# Patient Record
Sex: FEMALE | Race: WHITE | Employment: OTHER | URBAN - METROPOLITAN AREA
[De-identification: names, ages, dates, MRNs, and addresses within clinical notes are randomized per-mention and may not be internally consistent; named-entity substitution may affect disease eponyms.]

---

## 2022-02-22 ENCOUNTER — OFFICE VISIT (OUTPATIENT)
Dept: FAMILY MEDICINE CLINIC | Facility: CLINIC | Age: 77
End: 2022-02-22
Payer: MEDICARE

## 2022-02-22 VITALS
RESPIRATION RATE: 16 BRPM | HEART RATE: 90 BPM | DIASTOLIC BLOOD PRESSURE: 60 MMHG | BODY MASS INDEX: 29.45 KG/M2 | SYSTOLIC BLOOD PRESSURE: 152 MMHG | WEIGHT: 156 LBS | TEMPERATURE: 97.9 F | HEIGHT: 61 IN | OXYGEN SATURATION: 98 %

## 2022-02-22 DIAGNOSIS — E03.8 SUBCLINICAL HYPOTHYROIDISM: ICD-10-CM

## 2022-02-22 DIAGNOSIS — R73.01 IFG (IMPAIRED FASTING GLUCOSE): ICD-10-CM

## 2022-02-22 DIAGNOSIS — F32.A MILD DEPRESSION: ICD-10-CM

## 2022-02-22 DIAGNOSIS — Z11.59 NEED FOR HEPATITIS C SCREENING TEST: ICD-10-CM

## 2022-02-22 DIAGNOSIS — I10 BENIGN ESSENTIAL HTN: Primary | ICD-10-CM

## 2022-02-22 PROCEDURE — 99204 OFFICE O/P NEW MOD 45 MIN: CPT | Performed by: FAMILY MEDICINE

## 2022-02-22 RX ORDER — LOSARTAN POTASSIUM AND HYDROCHLOROTHIAZIDE 12.5; 5 MG/1; MG/1
1 TABLET ORAL EVERY MORNING
Qty: 30 TABLET | Refills: 5 | Status: SHIPPED | OUTPATIENT
Start: 2022-02-22

## 2022-02-22 RX ORDER — DOCUSATE SODIUM 100 MG/1
100 CAPSULE, LIQUID FILLED ORAL 2 TIMES DAILY
COMMUNITY

## 2022-02-22 RX ORDER — CHLORAL HYDRATE 500 MG
1000 CAPSULE ORAL DAILY
COMMUNITY

## 2022-02-22 RX ORDER — DIPHENOXYLATE HYDROCHLORIDE AND ATROPINE SULFATE 2.5; .025 MG/1; MG/1
1 TABLET ORAL DAILY
COMMUNITY

## 2022-02-22 RX ORDER — LISINOPRIL AND HYDROCHLOROTHIAZIDE 20; 12.5 MG/1; MG/1
TABLET ORAL
COMMUNITY
Start: 2021-12-31 | End: 2022-02-22 | Stop reason: SINTOL

## 2022-02-22 NOTE — PROGRESS NOTES
Assessment/Plan:     Problem List Items Addressed This Visit        Unprioritized    Benign essential HTN - Primary    Relevant Medications    losartan-hydrochlorothiazide (HYZAAR) 50-12 5 mg per tablet    Other Relevant Orders    Lipid panel    CBC and differential    Comprehensive metabolic panel    IFG (impaired fasting glucose)    Relevant Medications    losartan-hydrochlorothiazide (HYZAAR) 50-12 5 mg per tablet    Other Relevant Orders    Hemoglobin A1C    Subclinical hypothyroidism    Relevant Orders    TSH, 3rd generation with Free T4 reflex      Other Visit Diagnoses     Need for hepatitis C screening test        Relevant Orders    Hepatitis C antibody    Mild depression ([address] )          d/c lisinopril - cough   Start losartan-hydrochlorothiazide 50-12 5 mg daily   Mild depression on screening today  Pt does not feel depressed - feels normal grief process and life events  Does not feel she needs any help with this  Update fasting blood work including TSH  Will treat if getting higher - symptoms of fatigue, weight gain, constipation noted  Follow up in 4 weeks for labs, HTN   Follow up sept for [address]  Request old records for immunizations, dexa, colonoscopy     Return in 1 month (on 3/22/2022) for bp & labs       Subjective:   Teresita Roe is a 68 y o  female here today for a follow-up on her current medical conditions:  Patient Active Problem List   Diagnosis    Benign essential HTN    Subclinical hypothyroidism    IFG (impaired fasting glucose)        Current Medications:  Current Outpatient Medications   Medication Sig Dispense Refill    docusate sodium (COLACE) 100 mg capsule Take 100 mg by mouth 2 (two) times a day      multivitamin (THERAGRAN) TABS Take 1 tablet by mouth daily      Omega-3 Fatty Acids (fish oil) 1,000 mg Take 1,000 mg by mouth daily      losartan-hydrochlorothiazide (HYZAAR) 50-12 5 mg per tablet Take 1 tablet by mouth every morning 30 tablet 5     No current facility-administered medications for this visit  HPI:  Chief Complaint   Patient presents with    Hypertension    Cough     THINK B/P MEDICATION IS REASON FOR COUGH    Hand Pain     B/L      -- Above per clinical staff and reviewed  --    PHQ-2/9 Depression Screening    Little interest or pleasure in doing things: 3 - nearly every day  Feeling down, depressed, or hopeless: 0 - not at all  Trouble falling or staying asleep, or sleeping too much: 0 - not at all  Feeling tired or having little energy: 0 - not at all  Poor appetite or overeatin - several days  Feeling bad about yourself - or that you are a failure or have let yourself or your family down: 0 - not at all  Trouble concentrating on things, such as reading the newspaper or watching television: 0 - not at all  Moving or speaking so slowly that other people could have noticed  Or the opposite - being so fidgety or restless that you have been moving around a lot more than usual: 0 - not at all  Thoughts that you would be better off dead, or of hurting yourself in some way: 0 - not at all  PHQ-2 Score: 3  PHQ-2 Interpretation: POSITIVE depression screen  PHQ-9 Score: 4   PHQ-9 Interpretation: No or Minimal depression             New pt - shared record summary reviewed  labs HbA1C 21 6 1%, lipids 21   , HDL 74 , TG 72, Cr 0 92, urine: cr neg     dtr Sheila Sandoval lives in [place]   Today:  Here today with daughter Rashi Cannon   Patient provides all of her own history   Vaccines - requested records   3 COVID vaccines   She also had dexa scan - not sure of results   Cough went away when she stopped medicine   Bilateral hand pain   Lost weight and sugars improved and off meds   Her   Oct 2018 after illness with lymphoma for a few years  She cared for him  Before that for her mother     2 dtrs   4 grandkids - first grandchild on the way   Has been off of meds for about 4 years   Living with daughter since last year   30 pound weight loss - small portions and walking - was on glipizide only   Some dry skin, some weight gain  Not much fatigue or constipation   More sedentary recently - may be reason for weight gain   Has home blood pressure readings - has been very well controlled     The following portions of the patient's history were reviewed and updated as appropriate: allergies, current medications, past family history, past medical history, past social history, past surgical history and problem list     Objective:  Vitals:  /60   Pulse 90   Temp 97 9 °F (36 6 °C)   Resp 16   Ht 5' 0 63" (1 54 m)   Wt 70 8 kg (156 lb)   SpO2 98%   BMI 29 84 kg/m²    Wt Readings from Last 3 Encounters:   02/22/22 70 8 kg (156 lb)      BP Readings from Last 3 Encounters:   02/22/22 152/60        Review of Systems   She has no other concerns  Some weight gain  No chest pain, SOB, or palpitations  No GERD  + changes in bowels, no changes in bladder  Not sleeping well  mild mood changes  + fatigue  Physical Exam   Constitutional:  she appears well-developed and well-nourished  HENT: Head: Normocephalic  Neck: Neck supple  Cardiovascular: Normal rate, regular rhythm and normal heart sounds  Pulmonary/Chest: Effort normal and breath sounds normal  No wheezes, rales, or rhonchi  Abdominal: Soft  Bowel sounds are normal  There is no tenderness  No hepatosplenomegaly  Musculoskeletal: she exhibits no edema  Lymphadenopathy: she has no cervical adenopathy  Neurological: she is alert and oriented to person, place, and time  Skin: Skin is warm and dry  Psychiatric: she has a normal mood and affect  her behavior is normal  Thought content normal      BMI Counseling: Body mass index is 29 84 kg/m²  The BMI is above normal  Nutrition recommendations include decreasing portion sizes  Exercise recommendations include exercising 3-5 times per week  Rationale for BMI follow-up plan is due to patient being overweight or obese       Depression Screening and Follow-up Plan: Patient's depression screening was positive with a PHQ-2 score of 3  Their PHQ-9 score was 4  Patient assessed for underlying major depression  Brief counseling provided and recommend additional follow-up/re-evaluation next office visit  Spoke to pt alone  Pt denies depression   Gets down at times since loss of her  but feels this is normal

## 2022-02-23 PROBLEM — M85.89 OSTEOPENIA OF MULTIPLE SITES: Status: ACTIVE | Noted: 2022-02-23

## 2022-03-08 ENCOUNTER — APPOINTMENT (OUTPATIENT)
Dept: LAB | Facility: CLINIC | Age: 77
End: 2022-03-08
Payer: MEDICARE

## 2022-03-08 DIAGNOSIS — Z11.59 NEED FOR HEPATITIS C SCREENING TEST: ICD-10-CM

## 2022-03-08 DIAGNOSIS — R73.01 IFG (IMPAIRED FASTING GLUCOSE): ICD-10-CM

## 2022-03-08 DIAGNOSIS — I10 BENIGN ESSENTIAL HTN: ICD-10-CM

## 2022-03-08 DIAGNOSIS — E03.8 SUBCLINICAL HYPOTHYROIDISM: ICD-10-CM

## 2022-03-08 LAB
ALBUMIN SERPL BCP-MCNC: 3.7 G/DL (ref 3.5–5)
ALP SERPL-CCNC: 66 U/L (ref 46–116)
ALT SERPL W P-5'-P-CCNC: 30 U/L (ref 12–78)
ANION GAP SERPL CALCULATED.3IONS-SCNC: 8 MMOL/L (ref 4–13)
AST SERPL W P-5'-P-CCNC: 17 U/L (ref 5–45)
BASOPHILS # BLD AUTO: 0.05 THOUSANDS/ΜL (ref 0–0.1)
BASOPHILS NFR BLD AUTO: 1 % (ref 0–1)
BILIRUB SERPL-MCNC: 0.45 MG/DL (ref 0.2–1)
BUN SERPL-MCNC: 20 MG/DL (ref 5–25)
CALCIUM SERPL-MCNC: 9.6 MG/DL (ref 8.3–10.1)
CHLORIDE SERPL-SCNC: 102 MMOL/L (ref 100–108)
CHOLEST SERPL-MCNC: 224 MG/DL
CO2 SERPL-SCNC: 26 MMOL/L (ref 21–32)
CREAT SERPL-MCNC: 1.01 MG/DL (ref 0.6–1.3)
EOSINOPHIL # BLD AUTO: 0.13 THOUSAND/ΜL (ref 0–0.61)
EOSINOPHIL NFR BLD AUTO: 2 % (ref 0–6)
ERYTHROCYTE [DISTWIDTH] IN BLOOD BY AUTOMATED COUNT: 13.1 % (ref 11.6–15.1)
EST. AVERAGE GLUCOSE BLD GHB EST-MCNC: 128 MG/DL
GFR SERPL CREATININE-BSD FRML MDRD: 54 ML/MIN/1.73SQ M
GLUCOSE P FAST SERPL-MCNC: 131 MG/DL (ref 65–99)
HBA1C MFR BLD: 6.1 %
HCT VFR BLD AUTO: 39.9 % (ref 34.8–46.1)
HCV AB SER QL: NORMAL
HDLC SERPL-MCNC: 70 MG/DL
HGB BLD-MCNC: 12.9 G/DL (ref 11.5–15.4)
IMM GRANULOCYTES # BLD AUTO: 0.02 THOUSAND/UL (ref 0–0.2)
IMM GRANULOCYTES NFR BLD AUTO: 0 % (ref 0–2)
LDLC SERPL CALC-MCNC: 130 MG/DL (ref 0–100)
LYMPHOCYTES # BLD AUTO: 2.3 THOUSANDS/ΜL (ref 0.6–4.47)
LYMPHOCYTES NFR BLD AUTO: 30 % (ref 14–44)
MCH RBC QN AUTO: 31.4 PG (ref 26.8–34.3)
MCHC RBC AUTO-ENTMCNC: 32.3 G/DL (ref 31.4–37.4)
MCV RBC AUTO: 97 FL (ref 82–98)
MONOCYTES # BLD AUTO: 0.53 THOUSAND/ΜL (ref 0.17–1.22)
MONOCYTES NFR BLD AUTO: 7 % (ref 4–12)
NEUTROPHILS # BLD AUTO: 4.56 THOUSANDS/ΜL (ref 1.85–7.62)
NEUTS SEG NFR BLD AUTO: 60 % (ref 43–75)
NONHDLC SERPL-MCNC: 154 MG/DL
NRBC BLD AUTO-RTO: 0 /100 WBCS
PLATELET # BLD AUTO: 277 THOUSANDS/UL (ref 149–390)
PMV BLD AUTO: 10.3 FL (ref 8.9–12.7)
POTASSIUM SERPL-SCNC: 3.4 MMOL/L (ref 3.5–5.3)
PROT SERPL-MCNC: 8.4 G/DL (ref 6.4–8.2)
RBC # BLD AUTO: 4.11 MILLION/UL (ref 3.81–5.12)
SODIUM SERPL-SCNC: 136 MMOL/L (ref 136–145)
T4 FREE SERPL-MCNC: 0.96 NG/DL (ref 0.76–1.46)
TRIGL SERPL-MCNC: 118 MG/DL
TSH SERPL DL<=0.05 MIU/L-ACNC: 4.71 UIU/ML (ref 0.36–3.74)
WBC # BLD AUTO: 7.59 THOUSAND/UL (ref 4.31–10.16)

## 2022-03-08 PROCEDURE — 80053 COMPREHEN METABOLIC PANEL: CPT

## 2022-03-08 PROCEDURE — 84439 ASSAY OF FREE THYROXINE: CPT

## 2022-03-08 PROCEDURE — 84443 ASSAY THYROID STIM HORMONE: CPT

## 2022-03-08 PROCEDURE — 83036 HEMOGLOBIN GLYCOSYLATED A1C: CPT

## 2022-03-08 PROCEDURE — 85025 COMPLETE CBC W/AUTO DIFF WBC: CPT

## 2022-03-08 PROCEDURE — 86803 HEPATITIS C AB TEST: CPT

## 2022-03-08 PROCEDURE — 80061 LIPID PANEL: CPT

## 2022-03-08 PROCEDURE — 36415 COLL VENOUS BLD VENIPUNCTURE: CPT

## 2022-03-20 PROBLEM — E78.00 HYPERCHOLESTEROLEMIA: Status: ACTIVE | Noted: 2022-03-20

## 2022-03-22 ENCOUNTER — OFFICE VISIT (OUTPATIENT)
Dept: FAMILY MEDICINE CLINIC | Facility: CLINIC | Age: 77
End: 2022-03-22
Payer: MEDICARE

## 2022-03-22 VITALS
RESPIRATION RATE: 16 BRPM | TEMPERATURE: 98 F | HEIGHT: 61 IN | HEART RATE: 74 BPM | OXYGEN SATURATION: 96 % | BODY MASS INDEX: 30.21 KG/M2 | WEIGHT: 160 LBS | SYSTOLIC BLOOD PRESSURE: 154 MMHG | DIASTOLIC BLOOD PRESSURE: 60 MMHG

## 2022-03-22 DIAGNOSIS — Z23 NEED FOR VACCINATION: ICD-10-CM

## 2022-03-22 DIAGNOSIS — R73.01 IFG (IMPAIRED FASTING GLUCOSE): ICD-10-CM

## 2022-03-22 DIAGNOSIS — E78.00 HYPERCHOLESTEROLEMIA: ICD-10-CM

## 2022-03-22 DIAGNOSIS — I10 BENIGN ESSENTIAL HTN: Primary | ICD-10-CM

## 2022-03-22 DIAGNOSIS — E87.6 HYPOKALEMIA: ICD-10-CM

## 2022-03-22 DIAGNOSIS — E03.9 ACQUIRED HYPOTHYROIDISM: ICD-10-CM

## 2022-03-22 DIAGNOSIS — N18.31 STAGE 3A CHRONIC KIDNEY DISEASE (HCC): ICD-10-CM

## 2022-03-22 PROCEDURE — 99214 OFFICE O/P EST MOD 30 MIN: CPT | Performed by: FAMILY MEDICINE

## 2022-03-22 RX ORDER — LEVOTHYROXINE SODIUM 0.03 MG/1
25 TABLET ORAL
Qty: 90 TABLET | Refills: 3 | Status: SHIPPED | OUTPATIENT
Start: 2022-03-22 | End: 2022-07-01

## 2022-03-22 RX ORDER — ATORVASTATIN CALCIUM 20 MG/1
20 TABLET, FILM COATED ORAL DAILY
Qty: 90 TABLET | Refills: 3 | Status: SHIPPED | OUTPATIENT
Start: 2022-03-22

## 2022-03-22 NOTE — PROGRESS NOTES
Assessment/Plan:   1  Benign essential HTN  Home blood pressures well controlled  Mild hypokalemia on blood work  Will add high potassium foods   If continues consider d/c hydrochlorothiazide       3  IFG (impaired fasting glucose)  New  Watch carbs in diet  Update labs in 6 weeks  4  Hypercholesterolemia  Reviewed ASCVD risk  Reviewed pros and cons of statin  Agrees to start statin  Risks and benefits and side effects of lipitor  Start wth 20 mg  Check labs in 8 weeks  The 10-year ASCVD risk score (Krysta Berry et al , 2013) is: 32 5%    Values used to calculate the score:      Age: 68 years      Sex: Female      Is Non- : No      Diabetic: No      Tobacco smoker: No      Systolic Blood Pressure: 810 mmHg      Is BP treated: Yes      HDL Cholesterol: 70 mg/dL      Total Cholesterol: 224 mg/dL    - atorvastatin (LIPITOR) 20 mg tablet; Take 1 tablet (20 mg total) by mouth daily  Dispense: 90 tablet; Refill: 3  - LDL cholesterol, direct; Future  - Comprehensive metabolic panel; Future    5  Acquired hypothyroidism  New  Start synthroid  Reviewed how to take  Update labs in 8 weeks   - levothyroxine (Synthroid) 25 mcg tablet; Take 1 tablet (25 mcg total) by mouth daily in the early morning  Dispense: 90 tablet; Refill: 3  - TSH, 3rd generation with Free T4 reflex; Future    6  Stage 3a chronic kidney disease (HCC)  Stable  On ARB  7  Need for vaccination  Can get at pharmacy if she wishes  - tetanus-diphtheria-acellular pertussis (ADACEL) 5-2-15 5 LF-mcg/0 5 injection; Inject 0 5 mL into a muscle once for 1 dose  Dispense: 0 5 mL; Refill: 0    8  Hypokalemia  See above    Follow up for medicare wellness visit  Await old records for PPSV23 vaccine  If not received will update vaccine at this appointment  No follow-ups on file      Subjective:   Rosemarie Mccoy is a 68 y o  female here today for a follow-up on her current medical conditions:  Patient Active Problem List   Diagnosis    Benign essential HTN    Subclinical hypothyroidism    IFG (impaired fasting glucose)    Osteopenia of multiple sites    Hypercholesterolemia        Current Medications:  Current Outpatient Medications   Medication Sig Dispense Refill    docusate sodium (COLACE) 100 mg capsule Take 100 mg by mouth 2 (two) times a day      losartan-hydrochlorothiazide (HYZAAR) 50-12 5 mg per tablet Take 1 tablet by mouth every morning 30 tablet 5    multivitamin (THERAGRAN) TABS Take 1 tablet by mouth daily      Omega-3 Fatty Acids (fish oil) 1,000 mg Take 1,000 mg by mouth daily       No current facility-administered medications for this visit  HPI:  Chief Complaint   Patient presents with    Depression    Hypertension     -- Above per clinical staff and reviewed  --    PHQ-2/9 Depression Screening              march 2022 labs done   New pt - shared record summary reviewed  labs HbA1C 9/14/21 6 1%, liids 5/22/21   , HDL 74 , TG 72, Cr 0 92, urine: cr neg     dtr Willistine Seats   Today:  Pt here with daughter to review labs and check blood pressure    To be a great grandmother August 17th - As[place]      The following portions of the patient's history were reviewed and updated as appropriate: allergies, current medications, past family history, past medical history, past social history, past surgical history and problem list     Objective:  Vitals:  /60   Pulse 74   Temp 98 °F (36 7 °C)   Resp 16   Ht 5' 0 63" (1 54 m)   Wt 72 6 kg (160 lb)   SpO2 96%   BMI 30 60 kg/m²    Wt Readings from Last 3 Encounters:   03/22/22 72 6 kg (160 lb)   02/22/22 70 8 kg (156 lb)      BP Readings from Last 3 Encounters:   03/22/22 154/60   02/22/22 152/60        Review of Systems   She has no other concerns  No unexpected weight changes  No chest pain, SOB, or palpitations  No GERD  No changes in bowels or bladder  Sleeping well  No mood changes         Physical Exam   Constitutional:  she appears well-developed and well-nourished  HENT: Head: Normocephalic  Neck: Neck supple  Cardiovascular: Normal rate, regular rhythm and normal heart sounds  Pulmonary/Chest: Effort normal and breath sounds normal  No wheezes, rales, or rhonchi  Abdominal: Soft  Bowel sounds are normal  There is no tenderness  No hepatosplenomegaly  Musculoskeletal: she exhibits no edema  Lymphadenopathy: she has no cervical adenopathy  Neurological: she is alert and oriented to person, place, and time  Skin: Skin is warm and dry  Psychiatric: she has a normal mood and affect   her behavior is normal  Thought content normal

## 2022-03-22 NOTE — PATIENT INSTRUCTIONS
How to take synthroid:  Take once a day in the morning on an empty stomach for thyroid  An empty stomach means one hour before eating and 2 hours after eating  High-Potassium Foods  These foods contain more than 200 milligrams potassium per half-cup serving     ·         Apricots (2 medium)   ·         Artichokes (1 medium)   ·         Avocados (1/4 each)   ·         [place] squash   ·         Baked beans  ·         Black beans  ·         Bananas (1 medium)   ·         Beets and beet greens (cooked)   ·         [place]ton sprouts   ·         Carrots, raw  ·         Dried beans and peas  ·         Cantaloupe   ·         Dates, dried fruits  ·         Granola  ·         Grapefruit juice  ·         Honeydew  ·         Kiwi  ·         Raul  ·         Raisins   ·         Milk - all types (1 cup)  ·         Molasses  ·         Nectarines (1 each)   ·         Nuts and seeds (1 oz)  ·         Oranges and orange juice  ·         Papaya  ·         Pomegranate, pomegranate juice  ·         Parsnips   ·         Potatoes   ·         Potato chips   ·         Prunes and prune juice   ·         Pumpkin   ·         Spinach (cooked)   ·         Sweet potatoes   ·         Swiss chard (cooked)   ·         Tomatoes and tomato juice   ·         Vegetable juice  ·         Kohlrabi  ·         Lentils  ·         Legumes  ·         Mushrooms, canned  ·         Parsnips  ·         Potatoes, sweet and white   ·         Pumpkin  ·         R[place]abagas  ·         Yogurt  ·         Peanut butter (2 tsp)  ·         Snuff/chewing tobacco   ·         Chocolate (1 5 - 2 oz)  ·         Bran, bran products  ·         Salt substitutes

## 2022-06-30 ENCOUNTER — APPOINTMENT (OUTPATIENT)
Dept: LAB | Facility: CLINIC | Age: 77
End: 2022-06-30
Payer: MEDICARE

## 2022-06-30 DIAGNOSIS — E78.00 HYPERCHOLESTEROLEMIA: ICD-10-CM

## 2022-06-30 DIAGNOSIS — E03.9 ACQUIRED HYPOTHYROIDISM: ICD-10-CM

## 2022-06-30 LAB
ALBUMIN SERPL BCP-MCNC: 3.5 G/DL (ref 3.5–5)
ALP SERPL-CCNC: 76 U/L (ref 46–116)
ALT SERPL W P-5'-P-CCNC: 33 U/L (ref 12–78)
ANION GAP SERPL CALCULATED.3IONS-SCNC: 4 MMOL/L (ref 4–13)
AST SERPL W P-5'-P-CCNC: 18 U/L (ref 5–45)
BILIRUB SERPL-MCNC: 0.54 MG/DL (ref 0.2–1)
BUN SERPL-MCNC: 15 MG/DL (ref 5–25)
CALCIUM SERPL-MCNC: 9.7 MG/DL (ref 8.3–10.1)
CHLORIDE SERPL-SCNC: 103 MMOL/L (ref 100–108)
CO2 SERPL-SCNC: 30 MMOL/L (ref 21–32)
CREAT SERPL-MCNC: 0.88 MG/DL (ref 0.6–1.3)
GFR SERPL CREATININE-BSD FRML MDRD: 64 ML/MIN/1.73SQ M
GLUCOSE P FAST SERPL-MCNC: 142 MG/DL (ref 65–99)
LDLC SERPL DIRECT ASSAY-MCNC: 51 MG/DL (ref 0–100)
POTASSIUM SERPL-SCNC: 3.7 MMOL/L (ref 3.5–5.3)
PROT SERPL-MCNC: 7.7 G/DL (ref 6.4–8.2)
SODIUM SERPL-SCNC: 137 MMOL/L (ref 136–145)
T4 FREE SERPL-MCNC: 1.65 NG/DL (ref 0.76–1.46)
TSH SERPL DL<=0.05 MIU/L-ACNC: 0.01 UIU/ML (ref 0.45–4.5)

## 2022-06-30 PROCEDURE — 80053 COMPREHEN METABOLIC PANEL: CPT

## 2022-06-30 PROCEDURE — 84439 ASSAY OF FREE THYROXINE: CPT

## 2022-06-30 PROCEDURE — 83721 ASSAY OF BLOOD LIPOPROTEIN: CPT

## 2022-06-30 PROCEDURE — 84443 ASSAY THYROID STIM HORMONE: CPT

## 2022-06-30 PROCEDURE — 36415 COLL VENOUS BLD VENIPUNCTURE: CPT

## 2022-07-01 DIAGNOSIS — E03.8 SUBCLINICAL HYPOTHYROIDISM: Primary | ICD-10-CM

## 2022-09-21 ENCOUNTER — APPOINTMENT (OUTPATIENT)
Dept: LAB | Facility: CLINIC | Age: 77
End: 2022-09-21
Payer: MEDICARE

## 2022-09-21 DIAGNOSIS — E03.8 SUBCLINICAL HYPOTHYROIDISM: ICD-10-CM

## 2022-09-21 LAB — TSH SERPL DL<=0.05 MIU/L-ACNC: 3.6 UIU/ML (ref 0.45–4.5)

## 2022-09-21 PROCEDURE — 36415 COLL VENOUS BLD VENIPUNCTURE: CPT

## 2022-09-21 PROCEDURE — 84443 ASSAY THYROID STIM HORMONE: CPT

## 2022-10-02 ENCOUNTER — TELEPHONE (OUTPATIENT)
Dept: FAMILY MEDICINE CLINIC | Facility: CLINIC | Age: 77
End: 2022-10-02

## 2022-10-03 NOTE — TELEPHONE ENCOUNTER
Please call last doctor - [facility] (see scanned labs to see info) and ask them to fax over just her immunization records to our back fax number (Back fax line [phone])  We did not receive them and she has physical this week

## 2022-10-07 ENCOUNTER — OFFICE VISIT (OUTPATIENT)
Dept: FAMILY MEDICINE CLINIC | Facility: CLINIC | Age: 77
End: 2022-10-07
Payer: MEDICARE

## 2022-10-07 VITALS
RESPIRATION RATE: 16 BRPM | TEMPERATURE: 97.2 F | BODY MASS INDEX: 32.24 KG/M2 | HEART RATE: 70 BPM | WEIGHT: 164.2 LBS | HEIGHT: 60 IN | SYSTOLIC BLOOD PRESSURE: 152 MMHG | DIASTOLIC BLOOD PRESSURE: 68 MMHG | OXYGEN SATURATION: 96 %

## 2022-10-07 DIAGNOSIS — E78.00 HYPERCHOLESTEROLEMIA: ICD-10-CM

## 2022-10-07 DIAGNOSIS — I10 BENIGN ESSENTIAL HTN: ICD-10-CM

## 2022-10-07 DIAGNOSIS — R73.01 IFG (IMPAIRED FASTING GLUCOSE): ICD-10-CM

## 2022-10-07 DIAGNOSIS — E03.8 SUBCLINICAL HYPOTHYROIDISM: ICD-10-CM

## 2022-10-07 DIAGNOSIS — Z00.00 MEDICARE ANNUAL WELLNESS VISIT, INITIAL: Primary | ICD-10-CM

## 2022-10-07 DIAGNOSIS — N18.31 STAGE 3A CHRONIC KIDNEY DISEASE (HCC): ICD-10-CM

## 2022-10-07 DIAGNOSIS — Z23 NEED FOR VACCINATION: ICD-10-CM

## 2022-10-07 PROCEDURE — G0008 ADMIN INFLUENZA VIRUS VAC: HCPCS

## 2022-10-07 PROCEDURE — 90662 IIV NO PRSV INCREASED AG IM: CPT

## 2022-10-07 PROCEDURE — G0438 PPPS, INITIAL VISIT: HCPCS | Performed by: FAMILY MEDICINE

## 2022-10-07 PROCEDURE — 99214 OFFICE O/P EST MOD 30 MIN: CPT | Performed by: FAMILY MEDICINE

## 2022-10-07 RX ORDER — LOSARTAN POTASSIUM AND HYDROCHLOROTHIAZIDE 12.5; 5 MG/1; MG/1
1 TABLET ORAL EVERY MORNING
Qty: 90 TABLET | Refills: 1 | Status: SHIPPED | OUTPATIENT
Start: 2022-10-07

## 2022-10-07 RX ORDER — ATORVASTATIN CALCIUM 20 MG/1
20 TABLET, FILM COATED ORAL DAILY
Qty: 90 TABLET | Refills: 1 | Status: SHIPPED | OUTPATIENT
Start: 2022-10-07

## 2022-10-07 NOTE — PROGRESS NOTES
Assessment and Plan:     Problem List Items Addressed This Visit        Unprioritized    Subclinical hypothyroidism    Stage 3a chronic kidney disease ([facility] )    IFG (impaired fasting glucose)    Hypercholesterolemia    Relevant Medications    atorvastatin (LIPITOR) 20 mg tablet    Benign essential HTN    Relevant Medications    losartan-hydrochlorothiazide (HYZAAR) 50-12 5 mg per tablet      Other Visit Diagnoses     Medicare annual wellness visit, initial    -  Primary    Need for vaccination        Relevant Orders    influenza vaccine, high-dose, PF 0 7 mL (FLUZONE HIGH-DOSE) (Completed)        BMI Counseling: Body mass index is 32 07 kg/m²  The BMI is above normal  Nutrition recommendations include decreasing portion sizes  Exercise recommendations include exercising 3-5 times per week  Rationale for BMI follow-up plan is due to patient being overweight or obese  Depression Screening and Follow-up Plan: Patient was screened for depression during today's encounter  They screened negative with a PHQ-2 score of 0  Preventive health issues were discussed with patient, and age appropriate screening tests were ordered as noted in patient's After Visit Summary  Personalized health advice and appropriate referrals for health education or preventive services given if needed, as noted in patient's After Visit Summary       History of Present Illness:     Patient presents for a Medicare Wellness Visit    HPI   Patient Care Team:  Giana Sierra DO as PCP - General (Family Medicine)     Review of Systems:     Review of Systems     Problem List:     Patient Active Problem List   Diagnosis   • Benign essential HTN   • Subclinical hypothyroidism   • IFG (impaired fasting glucose)   • Osteopenia of multiple sites   • Hypercholesterolemia   • Stage 3a chronic kidney disease ([facility] )   • Counseling regarding advanced directives      Past Medical and Surgical History:     Past Medical History:   Diagnosis Date   • Diabetes mellitus ([facility] )    • Hypertension      Past Surgical History:   Procedure Laterality Date   • CHOLECYSTECTOMY     • WRIST FRACTURE SURGERY Right 1982      Family History:     Family History   Problem Relation Age of Onset   • Heart disease Mother    • Hypertension Mother    • Diabetes Mother    • Heart disease Father    • Cervical cancer Sister       Social History:     Social History     Socioeconomic History   • Marital status:      Spouse name: None   • Number of children: 2   • Years of education: None   • Highest education level: None   Occupational History   • Occupation: RETIRED    Tobacco Use   • Smoking status: Never Smoker   • Smokeless tobacco: Never Used   Vaping Use   • Vaping Use: Never used   Substance and Sexual Activity   • Alcohol use: Yes     Alcohol/week: 1 0 standard drink     Types: 1 Glasses of wine per week     Comment: 1 - 2 per week    • Drug use: Never   • Sexual activity: Not Currently   Other Topics Concern   • None   Social History Narrative   • None     Social Determinants of Health     Financial Resource Strain: Low Risk    • Difficulty of Paying Living Expenses: Not hard at all   Food Insecurity: Not on file   Transportation Needs: No Transportation Needs   • Lack of Transportation (Medical): No   • Lack of Transportation (Non-Medical):  No   Physical Activity: Not on file   Stress: Not on file   Social Connections: Not on file   Intimate Partner Violence: Not on file   Housing Stability: Not on file      Medications and Allergies:     Current Outpatient Medications   Medication Sig Dispense Refill   • atorvastatin (LIPITOR) 20 mg tablet Take 1 tablet (20 mg total) by mouth daily 90 tablet 1   • docusate sodium (COLACE) 100 mg capsule Take 100 mg by mouth 2 (two) times a day     • losartan-hydrochlorothiazide (HYZAAR) 50-12 5 mg per tablet Take 1 tablet by mouth every morning 90 tablet 1   • multivitamin (THERAGRAN) TABS Take 1 tablet by mouth daily     • Omega-3 Fatty Acids (fish oil) 1,000 mg Take 1,000 mg by mouth daily       No current facility-administered medications for this visit  No Known Allergies   Immunizations:     Immunization History   Administered Date(s) Administered   • COVID-19 MODERNA VACC 0 5 ML IM 02/04/2021, 03/04/2021, 11/05/2021, 06/28/2022   • COVID-19, unspecified 02/04/2021, 03/04/2021, 11/05/2021   • INFLUENZA 10/01/2019, 10/13/2020, 10/05/2021   • Influenza, high dose seasonal 0 7 mL 10/07/2022   • Pneumococcal Conjugate 13-Valent 08/27/2020   • Pneumococcal Polysaccharide PPV23 08/01/2019   • Tdap 06/15/2022   • Zoster 01/10/2020, 04/10/2020   • Zoster Vaccine Recombinant 12/28/2019, 03/04/2020      Health Maintenance:         Topic Date Due   • DXA SCAN  09/14/2023   • Hepatitis C Screening  Completed     There are no preventive care reminders to display for this patient  Medicare Screening Tests and Risk Assessments:     Khadijah Heránndez is here for her Initial Wellness visit  Last Medicare Wellness visit information reviewed, patient interviewed, no change since last AWV  Health Risk Assessment:   Patient rates overall health as very good  Patient feels that their physical health rating is same  Patient is satisfied with their life  Eyesight was rated as same  Hearing was rated as same  Patient feels that their emotional and mental health rating is same  Patients states they are never, rarely angry  Patient states they are sometimes unusually tired/fatigued  Pain experienced in the last 7 days has been none  Patient states that she has experienced weight loss or gain in last 6 months  Depression Screening:   PHQ-2 Score: 0      Fall Risk Screening: In the past year, patient has experienced: no history of falling in past year      Urinary Incontinence Screening:   Patient has not leaked urine accidently in the last six months  Home Safety:  Patient does not have trouble with stairs inside or outside of their home   Patient has working smoke alarms and has working carbon monoxide detector  Home safety hazards include: none  Nutrition:   Current diet is Regular  Medications:   Patient is currently taking over-the-counter supplements  OTC medications include: see medication list  Patient is able to manage medications  Activities of Daily Living (ADLs)/Instrumental Activities of Daily Living (IADLs):   Walk and transfer into and out of bed and chair?: Yes  Dress and groom yourself?: Yes    Bathe or shower yourself?: Yes    Feed yourself? Yes  Do your laundry/housekeeping?: Yes  Manage your money, pay your bills and track your expenses?: Yes  Make your own meals?: Yes    Do your own shopping?: Yes    Previous Hospitalizations:   Any hospitalizations or ED visits within the last 12 months?: No      Advance Care Planning:   Living will: Yes    Durable POA for healthcare: Yes    Advanced directive: Yes    Advanced directive counseling given: Yes    End of Life Decisions reviewed with patient: Yes      Comments: DNR  Cognitive Screening:   Provider or family/friend/caregiver concerned regarding cognition?: No    PREVENTIVE SCREENINGS      Cardiovascular Screening:    General: Screening Not Indicated and History Lipid Disorder      Diabetes Screening:     General: Screening Current      Colorectal Cancer Screening:     General: Screening Not Indicated      Breast Cancer Screening:     General: Screening Current      Cervical Cancer Screening:    General: Screening Not Indicated      Osteoporosis Screening:    General: Screening Current      Lung Cancer Screening:     General: Screening Not Indicated      Hepatitis C Screening:    General: Screening Current    Screening, Brief Intervention, and Referral to Treatment (SBIRT)    Screening  Typical number of drinks in a day: 0  Typical number of drinks in a week: 1  Interpretation: Low risk drinking behavior      AUDIT-C Screenin) How often did you have a drink containing alcohol in the past year? 2 to 4 times a month  2) How many drinks did you have on a typical day when you were drinking in the past year?  1 to 2  3) How often did you have 6 or more drinks on one occasion in the past year? never    AUDIT-C Score: 2  Interpretation: Score 0-2 (female): Negative screen for alcohol misuse    Single Item Drug Screening:  How often have you used an illegal drug (including marijuana) or a prescription medication for non-medical reasons in the past year? never    Single Item Drug Screen Score: 0  Interpretation: Negative screen for possible drug use disorder    No exam data present     Physical Exam:     /68   Pulse 70   Temp (!) 97 2 °F (36 2 °C)   Resp 16   Ht 5' (1 524 m)   Wt 74 5 kg (164 lb 3 2 oz)   SpO2 96%   BMI 32 07 kg/m²     Physical Exam     Guicho Soto, DO

## 2022-10-07 NOTE — PROGRESS NOTES
1  Medicare annual wellness visit, initial  See other note     2  Subclinical hypothyroidism  Was stared on synthroid 25 mcg and level went too low  Taken off and now TSH at 3 60  No symptoms at this time  Will monitor TSH  3  Benign essential HTN  Not well controlled  She is not checking at home  Recommend checking at home and nurse visit for calibration of home cuff  If remains high will add norvasc 5 mg ith visit 4 weeks later  - losartan-hydrochlorothiazide (HYZAAR) 50-12 5 mg per tablet; Take 1 tablet by mouth every morning  Dispense: 90 tablet; Refill: 1    4  Hypercholesterolemia  Well controlled  Tolerating liptiur well  - atorvastatin (LIPITOR) 20 mg tablet; Take 1 tablet (20 mg total) by mouth daily  Dispense: 90 tablet; Refill: 1    5  IFG (impaired fasting glucose)  Reviewed diet and ways to help  6  Stage 3a chronic kidney disease ([facility] [address] )  Improved from prior  Avoid NSAIDs or PPIs  Keep well hydrated  7  Need for vaccination  Given today  - influenza vaccine, high-dose, PF 0 7 mL (FLUZONE HIGH-DOSE)       Return in about 6 months (around 4/7/2023) for CC      Subjective:   Alysa Marques is a 68 y o  female here today for a follow-up on her current medical conditions:    Patient Active Problem List   Diagnosis   • Benign essential HTN   • Subclinical hypothyroidism   • IFG (impaired fasting glucose)   • Osteopenia of multiple sites   • Hypercholesterolemia   • Stage 3a chronic kidney disease ([facility] [address] )   • Counseling regarding advanced directives       Patient Care Team:  Rolf Awad DO as PCP - General (Family Medicine)    Current Medications:  Current Outpatient Medications   Medication Sig Dispense Refill   • atorvastatin (LIPITOR) 20 mg tablet Take 1 tablet (20 mg total) by mouth daily 90 tablet 1   • docusate sodium (COLACE) 100 mg capsule Take 100 mg by mouth 2 (two) times a day     • losartan-hydrochlorothiazide (HYZAAR) 50-12 5 mg per tablet Take 1 tablet by mouth every morning 90 tablet 1   • multivitamin (THERAGRAN) TABS Take 1 tablet by mouth daily     • Omega-3 Fatty Acids (fish oil) 1,000 mg Take 1,000 mg by mouth daily       No current facility-administered medications for this visit  HPI:  Chief Complaint   Patient presents with   • Medicare Wellness Visit     Pt denies any problems or concerns at this time  • Medication Refill     Refills pending  • HM     Unknown date of last Tdap  Due for covid booster, daughter will schedule  Interested in flu shot today  -- Above per clinical staff and reviewed  --    PHQ-2/9 Depression Screening    Little interest or pleasure in doing things: 0 - not at all  Feeling down, depressed, or hopeless: 0 - not at all  PHQ-2 Score: 0  PHQ-2 Interpretation: Negative depression screen       ? 9/21/22 TSH from 4 7 to 0 013, to 3 60   march 2022 labs done - statin, synthorid started  watch potassium  expecting new granddtr in august    New pt - shared record summary reviewed  labs HbA1C 9/14/21 6 1%, liids 5/22/21   , HDL 74 , TG 72, Cr   0 92, urine: cr neg GFR 54    dtr Cynthia Moreau   no Tdap found on records we received   (slw 4/12/22)   Today:  Here today with her daughter Vianca Caldwell  They are building a new home   Walking for exercise  Healthy foods most days   Planning to join the [facility] [employer] things off with the more   Little dry skin, energy normal , bowels normal sleeping well, solid 10 hours   Does get together with friends, but not often due to pandemic or everyone's schedule       The following portions of the patient's history were reviewed and updated as appropriate: allergies, current medications, past family history, past medical history, past social history, past surgical history and problem list     Objective:  Vitals:  /68   Pulse 70   Temp (!) 97 2 °F (36 2 °C)   Resp 16   Ht 5' (1 524 m)   Wt 74 5 kg (164 lb 3 2 oz)   SpO2 96%   BMI 32 07 kg/m²    Wt Readings from Last 3 Encounters:   10/07/22 74 5 kg (164 lb 3 2 oz)   03/22/22 72 6 kg (160 lb)   02/22/22 70 8 kg (156 lb)      BP Readings from Last 3 Encounters:   10/07/22 152/68   03/22/22 154/60   02/22/22 152/60        Review of Systems   She has no other concerns  No unexpected weight changes  No chest pain, SOB, or palpitations  No GERD  No changes in bowels or bladder  Sleeping well  No mood changes  Physical Exam   Constitutional:  she appears well-developed and well-nourished  HENT: Head: Normocephalic  Neck: Neck supple  Cardiovascular: Normal rate, regular rhythm and normal heart sounds  Pulmonary/Chest: Effort normal and breath sounds normal  No wheezes, rales, or rhonchi  Abdominal: Soft  Bowel sounds are normal  There is no tenderness  No hepatosplenomegaly  Musculoskeletal: she exhibits no edema  Lymphadenopathy: she has no cervical adenopathy  Neurological: she is alert and oriented to person, place, and time  Skin: Skin is warm and dry  Psychiatric: she has a normal mood and affect   her behavior is normal  Thought content normal

## 2022-10-09 PROBLEM — Z71.89 COUNSELING REGARDING ADVANCED DIRECTIVES: Status: ACTIVE | Noted: 2022-10-09

## 2022-10-19 ENCOUNTER — CLINICAL SUPPORT (OUTPATIENT)
Dept: FAMILY MEDICINE CLINIC | Facility: CLINIC | Age: 77
End: 2022-10-19

## 2022-10-19 VITALS — OXYGEN SATURATION: 99 % | DIASTOLIC BLOOD PRESSURE: 70 MMHG | HEART RATE: 71 BPM | SYSTOLIC BLOOD PRESSURE: 132 MMHG

## 2022-10-19 DIAGNOSIS — Z01.30 BP CHECK: Primary | ICD-10-CM

## 2023-04-08 PROBLEM — E03.9 ACQUIRED HYPOTHYROIDISM: Status: ACTIVE | Noted: 2023-04-08

## 2023-04-08 PROBLEM — E03.8 SUBCLINICAL HYPOTHYROIDISM: Status: RESOLVED | Noted: 2022-02-22 | Resolved: 2023-04-08

## 2023-04-10 PROBLEM — F32.0 MAJOR DEPRESSIVE DISORDER, SINGLE EPISODE, MILD (HCC): Status: ACTIVE | Noted: 2023-04-10

## 2023-04-10 PROBLEM — F32.0 MAJOR DEPRESSIVE DISORDER, SINGLE EPISODE, MILD (HCC): Status: RESOLVED | Noted: 2023-04-10 | Resolved: 2023-04-10

## 2023-04-24 ENCOUNTER — PATIENT MESSAGE (OUTPATIENT)
Dept: FAMILY MEDICINE CLINIC | Facility: CLINIC | Age: 78
End: 2023-04-24

## 2023-04-24 DIAGNOSIS — I10 BENIGN ESSENTIAL HTN: ICD-10-CM

## 2023-04-24 DIAGNOSIS — E87.1 HYPONATREMIA: Primary | ICD-10-CM

## 2023-04-25 RX ORDER — AMLODIPINE BESYLATE 2.5 MG/1
2.5 TABLET ORAL DAILY
Qty: 30 TABLET | Refills: 1 | Status: SHIPPED | OUTPATIENT
Start: 2023-04-25

## 2023-04-25 NOTE — PATIENT COMMUNICATION
Called patient's daughter Low Montero, she's been advised with Dr Ludivina Scruggs instructions  Patient has been scheduled for a nurse visit on 05/17/2023 @ 11:00AM to recheck BP  Low Montero states she is going to  the Amlodipine today and have patient start it randal Montero also states she will have patient complete labs prior to nurse visit

## 2023-04-26 ENCOUNTER — TELEPHONE (OUTPATIENT)
Dept: FAMILY MEDICINE CLINIC | Facility: CLINIC | Age: 78
End: 2023-04-26

## 2023-04-26 NOTE — PATIENT COMMUNICATION
Daughter call LM on VM-     Called Daughter back  She states that she is at work  Daughter states that she is feeling fine but she is unsure what her BP currently is   States that she will have to call us back with more information

## 2023-05-01 ENCOUNTER — APPOINTMENT (OUTPATIENT)
Dept: LAB | Age: 78
End: 2023-05-01

## 2023-05-01 DIAGNOSIS — E87.1 HYPONATREMIA: ICD-10-CM

## 2023-05-01 LAB
ANION GAP SERPL CALCULATED.3IONS-SCNC: 2 MMOL/L (ref 4–13)
BUN SERPL-MCNC: 19 MG/DL (ref 5–25)
CALCIUM SERPL-MCNC: 9.4 MG/DL (ref 8.3–10.1)
CHLORIDE SERPL-SCNC: 106 MMOL/L (ref 96–108)
CO2 SERPL-SCNC: 28 MMOL/L (ref 21–32)
CREAT SERPL-MCNC: 1.01 MG/DL (ref 0.6–1.3)
GFR SERPL CREATININE-BSD FRML MDRD: 53 ML/MIN/1.73SQ M
GLUCOSE SERPL-MCNC: 168 MG/DL (ref 65–140)
POTASSIUM SERPL-SCNC: 3.8 MMOL/L (ref 3.5–5.3)
SODIUM SERPL-SCNC: 136 MMOL/L (ref 135–147)

## 2023-05-09 ENCOUNTER — PATIENT MESSAGE (OUTPATIENT)
Dept: FAMILY MEDICINE CLINIC | Facility: CLINIC | Age: 78
End: 2023-05-09

## 2023-05-09 DIAGNOSIS — I10 BENIGN ESSENTIAL HTN: Primary | ICD-10-CM

## 2023-05-09 RX ORDER — AMLODIPINE BESYLATE 5 MG/1
5 TABLET ORAL DAILY
Qty: 30 TABLET | Refills: 1 | Status: SHIPPED | OUTPATIENT
Start: 2023-05-09

## 2023-05-09 RX ORDER — AMLODIPINE BESYLATE 2.5 MG/1
2.5 TABLET ORAL DAILY
Qty: 30 TABLET | Refills: 5 | Status: SHIPPED | OUTPATIENT
Start: 2023-05-09

## 2023-05-18 ENCOUNTER — PATIENT MESSAGE (OUTPATIENT)
Dept: FAMILY MEDICINE CLINIC | Facility: CLINIC | Age: 78
End: 2023-05-18

## 2023-05-18 DIAGNOSIS — I10 BENIGN ESSENTIAL HTN: Primary | ICD-10-CM

## 2023-05-18 DIAGNOSIS — N18.31 STAGE 3A CHRONIC KIDNEY DISEASE (HCC): ICD-10-CM

## 2023-05-22 ENCOUNTER — TELEPHONE (OUTPATIENT)
Dept: NEPHROLOGY | Facility: CLINIC | Age: 78
End: 2023-05-22

## 2023-06-20 DIAGNOSIS — I10 BENIGN ESSENTIAL HTN: ICD-10-CM

## 2023-06-20 RX ORDER — AMLODIPINE BESYLATE 5 MG/1
TABLET ORAL
Qty: 30 TABLET | Refills: 0 | Status: SHIPPED | OUTPATIENT
Start: 2023-06-20

## 2023-07-24 DIAGNOSIS — I10 BENIGN ESSENTIAL HTN: ICD-10-CM

## 2023-07-24 RX ORDER — AMLODIPINE BESYLATE 5 MG/1
TABLET ORAL
Qty: 30 TABLET | Refills: 0 | Status: SHIPPED | OUTPATIENT
Start: 2023-07-24

## 2023-08-19 DIAGNOSIS — E78.00 HYPERCHOLESTEROLEMIA: ICD-10-CM

## 2023-08-19 RX ORDER — ATORVASTATIN CALCIUM 20 MG/1
20 TABLET, FILM COATED ORAL DAILY
Qty: 90 TABLET | Refills: 0 | Status: SHIPPED | OUTPATIENT
Start: 2023-08-19

## 2023-08-23 DIAGNOSIS — I10 BENIGN ESSENTIAL HTN: ICD-10-CM

## 2023-08-23 RX ORDER — AMLODIPINE BESYLATE 2.5 MG/1
2.5 TABLET ORAL DAILY
Qty: 90 TABLET | Refills: 1 | Status: SHIPPED | OUTPATIENT
Start: 2023-08-23

## 2023-08-23 RX ORDER — AMLODIPINE BESYLATE 5 MG/1
TABLET ORAL
Qty: 90 TABLET | Refills: 1 | Status: SHIPPED | OUTPATIENT
Start: 2023-08-23

## 2023-08-23 NOTE — TELEPHONE ENCOUNTER
Please send a 90 day supply as patient is going away for a while to her daughter's and does not want to run out.   Please send over today

## 2023-10-03 DIAGNOSIS — R73.01 IFG (IMPAIRED FASTING GLUCOSE): ICD-10-CM

## 2023-10-03 DIAGNOSIS — I10 BENIGN ESSENTIAL HTN: ICD-10-CM

## 2023-10-04 RX ORDER — LOSARTAN POTASSIUM 50 MG/1
50 TABLET ORAL 2 TIMES DAILY
Qty: 180 TABLET | Refills: 0 | Status: SHIPPED | OUTPATIENT
Start: 2023-10-04

## 2023-10-10 DIAGNOSIS — I10 BENIGN ESSENTIAL HTN: ICD-10-CM

## 2023-10-10 DIAGNOSIS — R73.01 IFG (IMPAIRED FASTING GLUCOSE): ICD-10-CM

## 2023-10-12 RX ORDER — METFORMIN HYDROCHLORIDE 500 MG/1
500 TABLET, EXTENDED RELEASE ORAL 2 TIMES DAILY
Qty: 180 TABLET | Refills: 0 | Status: SHIPPED | OUTPATIENT
Start: 2023-10-12

## 2023-11-01 ENCOUNTER — APPOINTMENT (OUTPATIENT)
Dept: LAB | Age: 78
End: 2023-11-01
Payer: MEDICARE

## 2023-11-01 DIAGNOSIS — R73.01 IFG (IMPAIRED FASTING GLUCOSE): ICD-10-CM

## 2023-11-01 DIAGNOSIS — I10 BENIGN ESSENTIAL HTN: ICD-10-CM

## 2023-11-01 LAB
ALBUMIN SERPL BCP-MCNC: 4.3 G/DL (ref 3.5–5)
ALP SERPL-CCNC: 67 U/L (ref 34–104)
ALT SERPL W P-5'-P-CCNC: 27 U/L (ref 7–52)
ANION GAP SERPL CALCULATED.3IONS-SCNC: 11 MMOL/L
AST SERPL W P-5'-P-CCNC: 21 U/L (ref 13–39)
BILIRUB SERPL-MCNC: 0.41 MG/DL (ref 0.2–1)
BUN SERPL-MCNC: 17 MG/DL (ref 5–25)
CALCIUM SERPL-MCNC: 9.6 MG/DL (ref 8.4–10.2)
CHLORIDE SERPL-SCNC: 99 MMOL/L (ref 96–108)
CO2 SERPL-SCNC: 27 MMOL/L (ref 21–32)
CREAT SERPL-MCNC: 0.93 MG/DL (ref 0.6–1.3)
EST. AVERAGE GLUCOSE BLD GHB EST-MCNC: 140 MG/DL
GFR SERPL CREATININE-BSD FRML MDRD: 59 ML/MIN/1.73SQ M
GLUCOSE P FAST SERPL-MCNC: 116 MG/DL (ref 65–99)
HBA1C MFR BLD: 6.5 %
POTASSIUM SERPL-SCNC: 3.7 MMOL/L (ref 3.5–5.3)
PROT SERPL-MCNC: 8 G/DL (ref 6.4–8.4)
SODIUM SERPL-SCNC: 137 MMOL/L (ref 135–147)
TSH SERPL DL<=0.05 MIU/L-ACNC: 3.42 UIU/ML (ref 0.45–4.5)

## 2023-11-01 PROCEDURE — 80053 COMPREHEN METABOLIC PANEL: CPT

## 2023-11-01 PROCEDURE — 84443 ASSAY THYROID STIM HORMONE: CPT

## 2023-11-01 PROCEDURE — 36415 COLL VENOUS BLD VENIPUNCTURE: CPT

## 2023-11-01 PROCEDURE — 83036 HEMOGLOBIN GLYCOSYLATED A1C: CPT

## 2023-11-06 PROBLEM — N18.31 TYPE 2 DIABETES MELLITUS WITH STAGE 3A CHRONIC KIDNEY DISEASE, WITHOUT LONG-TERM CURRENT USE OF INSULIN (HCC): Status: ACTIVE | Noted: 2022-02-22

## 2023-11-06 PROBLEM — E11.22 TYPE 2 DIABETES MELLITUS WITH STAGE 3A CHRONIC KIDNEY DISEASE, WITHOUT LONG-TERM CURRENT USE OF INSULIN (HCC): Status: ACTIVE | Noted: 2022-02-22

## 2023-11-07 ENCOUNTER — OFFICE VISIT (OUTPATIENT)
Dept: FAMILY MEDICINE CLINIC | Facility: CLINIC | Age: 78
End: 2023-11-07
Payer: MEDICARE

## 2023-11-07 VITALS
BODY MASS INDEX: 30.04 KG/M2 | HEIGHT: 60 IN | DIASTOLIC BLOOD PRESSURE: 62 MMHG | WEIGHT: 153 LBS | HEART RATE: 81 BPM | SYSTOLIC BLOOD PRESSURE: 124 MMHG | RESPIRATION RATE: 14 BRPM | OXYGEN SATURATION: 97 %

## 2023-11-07 DIAGNOSIS — N39.3 FEMALE STRESS INCONTINENCE: ICD-10-CM

## 2023-11-07 DIAGNOSIS — Z00.00 ENCOUNTER FOR MEDICARE ANNUAL WELLNESS EXAM: Primary | ICD-10-CM

## 2023-11-07 DIAGNOSIS — Z71.89 COUNSELING REGARDING ADVANCED DIRECTIVES: ICD-10-CM

## 2023-11-07 DIAGNOSIS — I10 BENIGN ESSENTIAL HTN: ICD-10-CM

## 2023-11-07 DIAGNOSIS — M85.89 OSTEOPENIA OF MULTIPLE SITES: ICD-10-CM

## 2023-11-07 DIAGNOSIS — E03.9 ACQUIRED HYPOTHYROIDISM: ICD-10-CM

## 2023-11-07 DIAGNOSIS — N18.31 TYPE 2 DIABETES MELLITUS WITH STAGE 3A CHRONIC KIDNEY DISEASE, WITHOUT LONG-TERM CURRENT USE OF INSULIN (HCC): ICD-10-CM

## 2023-11-07 DIAGNOSIS — N18.31 STAGE 3A CHRONIC KIDNEY DISEASE (HCC): ICD-10-CM

## 2023-11-07 DIAGNOSIS — E78.00 HYPERCHOLESTEROLEMIA: ICD-10-CM

## 2023-11-07 DIAGNOSIS — E11.22 TYPE 2 DIABETES MELLITUS WITH STAGE 3A CHRONIC KIDNEY DISEASE, WITHOUT LONG-TERM CURRENT USE OF INSULIN (HCC): ICD-10-CM

## 2023-11-07 DIAGNOSIS — Z78.0 POST-MENOPAUSE: ICD-10-CM

## 2023-11-07 DIAGNOSIS — G31.84 MILD COGNITIVE IMPAIRMENT: ICD-10-CM

## 2023-11-07 PROCEDURE — 99214 OFFICE O/P EST MOD 30 MIN: CPT | Performed by: FAMILY MEDICINE

## 2023-11-07 PROCEDURE — G0439 PPPS, SUBSEQ VISIT: HCPCS | Performed by: FAMILY MEDICINE

## 2023-11-07 RX ORDER — BLOOD SUGAR DIAGNOSTIC
STRIP MISCELLANEOUS
Qty: 100 EACH | Refills: 3 | Status: SHIPPED | OUTPATIENT
Start: 2023-11-07

## 2023-11-07 RX ORDER — AMLODIPINE BESYLATE 2.5 MG/1
2.5 TABLET ORAL DAILY
Qty: 90 TABLET | Refills: 1 | Status: SHIPPED | OUTPATIENT
Start: 2023-11-07

## 2023-11-07 RX ORDER — LANCETS 33 GAUGE
EACH MISCELLANEOUS
Qty: 100 EACH | Refills: 3 | Status: SHIPPED | OUTPATIENT
Start: 2023-11-07

## 2023-11-07 NOTE — PROGRESS NOTES
1. Encounter for Medicare annual wellness exam  Comments:  see below    2. Mild cognitive impairment  -     Ambulatory Referral to Speech Therapy; Future    3. Acquired hypothyroidism  Assessment & Plan:  Well controlled. Orders:  -     TSH, 3rd generation; Future; Expected date: 05/07/2024  -     T3, free; Future; Expected date: 05/07/2024  -     T4, free; Future; Expected date: 05/07/2024    4. Hypercholesterolemia  Assessment & Plan:  Well controlled on statin. Tolerating well. 5. Type 2 diabetes mellitus with stage 3a chronic kidney disease, without long-term current use of insulin ([address])  Assessment & Plan:  Improved from prior. Pt walking more. Has lost 9 pounds since last visit - she is happy about this. Daughter and pt both not concerned or surprised by the weight loss. Continue metformin 500 mg twice a day. Schedule eye exam. On ARB and statin. Update fasting blood work prior to follow up. Lab Results   Component Value Date    HGBA1C 6.5 (H) 11/01/2023       Orders:  -     Comprehensive metabolic panel; Future; Expected date: 05/07/2024  -     Hemoglobin A1C; Future; Expected date: 05/07/2024  -     Lipid panel; Future; Expected date: 05/07/2024  -     glucose blood (OneTouch Ultra) test strip; Check blood sugars once daily. Please substitute with appropriate alternative as covered by patient's insurance. Dx: E11.65  -     OneTouch Delica Lancets 87J MISC; Check blood sugars once daily. Please substitute with appropriate alternative as covered by patient's insurance. Dx: E11.65  -     Albumin / creatinine urine ratio; Future    6. Benign essential HTN  Assessment & Plan:  Well controlled on current meds. Would like 90 day refills when needed     Orders:  -     amLODIPine (NORVASC) 2.5 mg tablet; Take 1 tablet (2.5 mg total) by mouth daily 5 + 2.5 for a total of 7.5 mg    7. Osteopenia of multiple sites  Assessment & Plan:  Due for updated dexa scan.      Orders:  -     DXA bone density spine hip and pelvis; Future; Expected date: 11/07/2023    8. Stage 3a chronic kidney disease [facility])  Assessment & Plan:  Lab Results   Component Value Date    EGFR 59 11/01/2023    EGFR 53 05/01/2023    EGFR 55 04/14/2023    CREATININE 0.93 11/01/2023    CREATININE 1.01 05/01/2023    CREATININE 0.98 04/14/2023   Well controlled. Update urine microalbumin:creatinine ratio before follow up. On ARB. 9. Counseling regarding advanced directives  Assessment & Plan:  Confirmed today. DNR. Medical GUR[facility] (& [place], [place]). Will bring in the copy. 10. Post-menopause  -     DXA bone density spine hip and pelvis; Future; Expected date: 11/07/2023    11. Female stress incontinence         No follow-ups on file. Subjective:   Saloni Baldwin is a 66 y.o. female here today for a follow-up on her current medical conditions:    Patient Active Problem List   Diagnosis   • Benign essential HTN   • Type 2 diabetes mellitus with stage 3a chronic kidney disease, without long-term current use of insulin ([facility])   • Osteopenia of multiple sites   • Hypercholesterolemia   • Stage 3a chronic kidney disease ([address])   • Counseling regarding advanced directives   • Acquired hypothyroidism       Patient Care Team:  Rosie Cummins DO as PCP - General (Family Medicine)    Current Medications:  Current Outpatient Medications   Medication Sig Dispense Refill   • amLODIPine (NORVASC) 2.5 mg tablet Take 1 tablet (2.5 mg total) by mouth daily 5 + 2.5 for a total of 7.5 mg 90 tablet 1   • amLODIPine (NORVASC) 5 mg tablet TAKE ONE TABLET BY MOUTH ONE TIME DAILY. TAKE IN ADDITION TO THE 2.5 MG TABLET FOR A TOTAL DAILY DOSE: 7.5 MG 90 tablet 1   • atorvastatin (LIPITOR) 20 mg tablet Take 1 tablet (20 mg total) by mouth daily. 90 tablet 0   • docusate sodium (COLACE) 100 mg capsule Take 100 mg by mouth 2 (two) times a day     • glucose blood (OneTouch Ultra) test strip Check blood sugars once daily.  Please substitute with appropriate alternative as covered by patient's insurance. Dx: E11.65 100 each 3   • losartan (COZAAR) 50 mg tablet Take 1 tablet by mouth 2 times a day 180 tablet 0   • metFORMIN (GLUCOPHAGE-XR) 500 mg 24 hr tablet Take 1 tablet by mouth 2 times a day 180 tablet 0   • multivitamin (THERAGRAN) TABS Take 1 tablet by mouth daily     • Omega-3 Fatty Acids (fish oil) 1,000 mg Take 1,000 mg by mouth daily     • OneTouch Delica Lancets 92O MISC Check blood sugars once daily. Please substitute with appropriate alternative as covered by patient's insurance. Dx: E11.65 100 each 3     No current facility-administered medications for this visit. HPI:  Chief Complaint   Patient presents with   • Medicare Wellness Visit     Declined DM Eye      -- Above per clinical staff and reviewed. --    PHQ-2/9 Depression Screening    Little interest or pleasure in doing things: 3 - nearly every day  Feeling down, depressed, or hopeless: 3 - nearly every day  Trouble falling or staying asleep, or sleeping too much: 3 - nearly every day  Feeling tired or having little energy: 1 - several days  Poor appetite or overeating: 3 - nearly every day  Feeling bad about yourself - or that you are a failure or have let yourself or your family down: 3 - nearly every day  Trouble concentrating on things, such as reading the newspaper or watching television: 0 - not at all  Moving or speaking so slowly that other people could have noticed. Or the opposite - being so fidgety or restless that you have been moving around a lot more than usual: 0 - not at all  Thoughts that you would be better off dead, or of hurting yourself in some way: 0 - not at all  PHQ-2 Score: 6  PHQ-2 Interpretation: POSITIVE depression screen  PHQ-9 Score: 16   PHQ-9 Interpretation: Moderately severe depression        MARLY-7 Flowsheet Screening    Flowsheet Row Most Recent Value   Over the last 2 weeks, how often have you been bothered by any of the following problems?     Feeling nervous, anxious, or on edge 0   Not being able to stop or control worrying 0   Worrying too much about different things 0   Trouble relaxing 0   Being so restless that it is hard to sit still 0   Becoming easily annoyed or irritable 0   Feeling afraid as if something awful might happen 0   MARLY-7 Total Score 0           Oct 2023 due for TSH, CMP, HbA1C, vit D   Nov 2023 labs HbA1C 6.8 to 6.5 , TSH 5.7 to 3.415, , GFR 59  9/21/22 TSH from 4.7 to 0.013, to 3.60   march 2022 labs done - statin, synthorid started. watch potassium. expecting new granddtr in august.     New pt - shared record summary reviewed. labs HbA1C 9/14/21 6.1%, liids 5/22/21   , HDL 74 , TG 72, Cr 0.92, urine: cr neg GFR 54  . dtr Ignacio Hug   no Tdap found on records we received. (slw 4/12/22)   Today:  Here with her daughter Tuloi Ayers today   Has not had an eye visit but plans to - [facility] is who her daughter goes to   Walking more   Mood fine despite how she completed the PHQ9 - when asked says she just filled this out wrong   When asked pt denies memory issues but daughter says they have noticed some decline.    Not driving, not cooking other than microwave  Does her own banking, laundry, feeding herself, feeding cat, does better with a routine - gets confused when she has to stay somewhere else   Not a lot of socialization - does still have ladies night    The following portions of the patient's history were reviewed and updated as appropriate: allergies, current medications, past family history, past medical history, past social history, past surgical history and problem list.    Objective:  Vitals:  /62   Pulse 81   Resp 14   Ht 5' (1.524 m)   Wt 69.4 kg (153 lb)   SpO2 97%   BMI 29.88 kg/m²    Wt Readings from Last 3 Encounters:   11/07/23 69.4 kg (153 lb)   04/10/23 73.6 kg (162 lb 3.2 oz)   10/07/22 74.5 kg (164 lb 3.2 oz)      BP Readings from Last 3 Encounters:   11/07/23 124/62   04/10/23 124/70   10/19/22 132/70        Review of Systems   She has no other concerns. No unexpected weight changes - she and daughter feel it is due to more activity and diet changes. She is happy about weight loss. No chest pain, SOB, or palpitations. No GERD. No changes in bowels or bladder. Sleeping well. Denies  mood changes. Denies memory changes. Physical Exam   Constitutional:  she appears well-developed and well-nourished. HENT: Head: Normocephalic. Neck: Neck supple. Cardiovascular: Normal rate, regular rhythm and normal heart sounds. Pulmonary/Chest: Effort normal and breath sounds normal. No wheezes, rales, or rhonchi. Abdominal: Soft. Bowel sounds are normal. There is no tenderness. No hepatosplenomegaly. Musculoskeletal: she exhibits no edema. Lymphadenopathy: she has no cervical adenopathy. Neurological: she is alert but confused today. Looks to daughter to help her answer questions by saying things like "oh you should ask her that."   Skin: Skin is warm and dry. Psychiatric: she has a normal mood and flat affect. her behavior is normal. Thought content normal. Speaks very quietly. BMI Counseling: Body mass index is 29.88 kg/m². The BMI is above normal. Nutrition recommendations include decreasing portion sizes. Exercise recommendations include exercising 3-5 times per week. Rationale for BMI follow-up plan is due to patient being overweight or obese. Depression Screening and Follow-up Plan: Patient's depression screening was positive with a PHQ-2 score of 6. Their PHQ-9 score was 16. Clincally patient does not have depression. No treatment is required.  Pt denies depression - says she filled out the form wrong  Daughter also does not have concerns with her mood - she is interacting as usual. Does not seem down or withdrawn

## 2023-11-07 NOTE — PATIENT INSTRUCTIONS
Urinary Incontinence   AMBULATORY CARE:   Urinary incontinence (UI)  is loss of bladder control. UI develops because your bladder cannot store or empty urine properly. The 3 most common types of UI are stress incontinence, urge incontinence, or both. Common symptoms include the following: You feel like your bladder does not empty completely when you urinate. You urinate often and need to urinate immediately. You leak urine when you sleep, or you wake up with the urge to urinate. You leak urine when you cough, sneeze, exercise, or laugh. Seek care immediately if:   You have severe pain. You are confused or cannot think clearly. You see blood in your urine. You have pain when you urinate. You have new or worse pain, even after treatment. Call your doctor if:   You have a fever. Your mouth feels dry or you have vision changes. Your urine is cloudy or smells bad. You have questions or concerns about your condition or care. Treatment  may include any of the following:  Medicines  may be given to help strengthen your bladder control. Electrical stimulation  is used to send a small amount of electrical energy to your pelvic floor muscles. This helps control your bladder function. Electrodes may be placed outside your body or in your rectum. For women, the electrodes may be placed in the vagina. A bulking agent  may be injected into the wall of your urethra to make it thicker. This helps keep your urethra closed and decreases urine leakage. Devices  such as a clamp, pessary, or tampon may help stop urine leaks. Ask your healthcare provider for more information about these and other devices. Surgery  may be needed if other treatments do not work. Several types of surgery can help improve your bladder control. Ask your provider for more information about the surgery you may need. Self-care:   Keep a UI record.   Write down how often you leak urine and how much you leak. Make a note of what you were doing when you leaked urine. Drink liquids as directed. Ask your healthcare provider how much liquid to drink each day and which liquids are best for you. You may need to limit the amount of liquid you drink to help control your urine leakage. Do not drink any liquid right before you go to bed. Limit or do not have drinks that contain caffeine or alcohol. Prevent constipation. Eat a variety of high-fiber foods. Good examples are high-fiber cereals, beans, vegetables, and whole-grain breads. Prune juice may help make your bowel movement softer. Walking is the best way to trigger your intestines to have a bowel movement. Exercise regularly and maintain a healthy weight. Ask your provider how much you should weigh and about the best exercise plan for you. Weight loss and exercise will decrease pressure on your bladder and help you control your leakage. Ask your provider to help you create a weight loss plan, if needed. Use a catheter as directed  to help empty your bladder. A catheter is a tiny, plastic tube that is put into your bladder to drain your urine. Your provider may tell you to use a catheter to prevent your bladder from getting too full and leaking urine. Go to behavior therapy as directed. Behavior therapy may be used to help you learn to control your urge to urinate. Follow up with your doctor as directed:  Write down your questions so you remember to ask them during your visits. © Copyright Marzetta Andrade 2023 Information is for End User's use only and may not be sold, redistributed or otherwise used for commercial purposes. The above information is an  only. It is not intended as medical advice for individual conditions or treatments. Talk to your doctor, nurse or pharmacist before following any medical regimen to see if it is safe and effective for you.     Kegel Exercises for Women   AMBULATORY CARE:   Kegel exercises help strengthen your pelvic muscles. Pelvic muscles hold your pelvic organs, such as your bladder and uterus, in place. Kegel exercises help prevent or control certain conditions, such as urine incontinence (leakage) or uterine prolapse. Call your doctor or physical therapist if:   You cannot feel your muscles tighten or relax. You continue to leak urine. You have questions or concerns about your condition or care. Use the correct muscles:  Pelvic muscles are the muscles you use to control urine flow. To target these muscles, stop and start the flow of urine several times. This will help you become familiar with how it feels to tighten and relax these muscles. How to do Kegel exercises:   Get into a comfortable position. You may lie down, stand up, or sit down to do these exercises. When you first try to do these exercises, it may be easier if you lie down. Tighten or squeeze your pelvic muscles slowly. It may feel like you are trying to hold back urine or gas. Hold this position for 3 seconds. Relax for 3 seconds. Repeat this cycle 10 times. Do not hold your breath when you do Kegel exercises. Keep your stomach, back, and leg muscles relaxed. Do 10 sets of Kegel exercises, at least 3 times a day. When you know how to do Kegel exercises, use different positions. This will help to strengthen your pelvic muscles as much as possible. You can do these exercises while you lie on the floor, watch TV, or while you stand. Tighten your pelvic muscles before you sneeze, cough, or lift to prevent urine leakage. You may notice improved bladder control within about 6 weeks. Follow up with your doctor or physical therapist as directed:  Write down your questions so you remember to ask them during your visits. © Copyright Miller Perez 2023 Information is for End User's use only and may not be sold, redistributed or otherwise used for commercial purposes. The above information is an  only. It is not intended as medical advice for individual conditions or treatments. Talk to your doctor, nurse or pharmacist before following any medical regimen to see if it is safe and effective for you.

## 2023-11-07 NOTE — PROGRESS NOTES
Diabetic Foot Exam    Patient's shoes and socks removed. Right Foot/Ankle   Right Foot Inspection  Skin Exam: skin normal and skin intact. No dry skin, no warmth, no callus, no erythema, no maceration, no abnormal color, no pre-ulcer, no ulcer and no callus. Toe Exam: ROM and strength within normal limits. Sensory   Monofilament testing: intact    Vascular  Capillary refills: < 3 seconds  The right DP pulse is 2+. Left Foot/Ankle  Left Foot Inspection  Skin Exam: skin normal and skin intact. No dry skin, no warmth, no erythema, no maceration, normal color, no pre-ulcer, no ulcer and no callus. Toe Exam: ROM and strength within normal limits. Sensory   Monofilament testing: intact    Vascular  Capillary refills: < 3 seconds  The left DP pulse is 2+. Assign Risk Category  No deformity present  No loss of protective sensation  No weak pulses  Risk: 0   Assessment and Plan:     Problem List Items Addressed This Visit        Unprioritized    Type 2 diabetes mellitus with stage 3a chronic kidney disease, without long-term current use of insulin ([address])     Improved from prior. Pt walking more. Has lost 9 pounds since last visit - she is happy about this. Daughter and pt both not concerned or surprised by the weight loss. Continue metformin 500 mg twice a day. Schedule eye exam. On ARB and statin. Update fasting blood work prior to follow up. Lab Results   Component Value Date    HGBA1C 6.5 (H) 11/01/2023            Relevant Medications    glucose blood (OneTouch Ultra) test strip    OneTouch Delica Lancets 83K MISC    Other Relevant Orders    Comprehensive metabolic panel    Hemoglobin A1C    Lipid panel    Albumin / creatinine urine ratio    Stage 3a chronic kidney disease (HCC)     Lab Results   Component Value Date    EGFR 59 11/01/2023    EGFR 53 05/01/2023    EGFR 55 04/14/2023    CREATININE 0.93 11/01/2023    CREATININE 1.01 05/01/2023    CREATININE 0.98 04/14/2023   Well controlled. Update urine microalbumin:creatinine ratio before follow up. On ARB. Osteopenia of multiple sites     Due for updated dexa scan. Relevant Orders    DXA bone density spine hip and pelvis    Hypercholesterolemia     Well controlled on statin. Tolerating well. Counseling regarding advanced directives     Confirmed today. DNR. Medical GURDEEP Rodriguez (& [facility], [place]). Will bring in the copy. Benign essential HTN     Well controlled on current meds. Would like 90 day refills when needed          Relevant Medications    amLODIPine (NORVASC) 2.5 mg tablet    Acquired hypothyroidism     Well controlled. Relevant Orders    TSH, 3rd generation    T3, free    T4, free   Other Visit Diagnoses     Encounter for Medicare annual wellness exam    -  Primary    see below    Mild cognitive impairment        Relevant Orders    Ambulatory Referral to Speech Therapy    Post-menopause        Relevant Orders    DXA bone density spine hip and pelvis    Female stress incontinence              Depression Screening and Follow-up Plan: Patient's depression screening was positive with a PHQ-2 score of 6. Their PHQ-9 score was 16. Urinary Incontinence Plan of Care: counseling topics discussed: practice Kegel (pelvic floor strengthening) exercises. Preventive health issues were discussed with patient, and age appropriate screening tests were ordered as noted in patient's After Visit Summary. Personalized health advice and appropriate referrals for health education or preventive services given if needed, as noted in patient's After Visit Summary.      History of Present Illness:     Patient presents for a Medicare Wellness Visit    HPI   Patient Care Team:  Ruddy Lema DO as PCP - General (Family Medicine)     Review of Systems:     Review of Systems     Problem List:     Patient Active Problem List   Diagnosis   • Benign essential HTN   • Type 2 diabetes mellitus with stage 3a chronic kidney disease, without long-term current use of insulin (HCC)   • Osteopenia of multiple sites   • Hypercholesterolemia   • Stage 3a chronic kidney disease ([address])   • Counseling regarding advanced directives   • Acquired hypothyroidism      Past Medical and Surgical History:     Past Medical History:   Diagnosis Date   • Diabetes mellitus ([address])    • Hypertension    • Major depressive disorder, single episode, mild ([address]) 4/10/2023     Past Surgical History:   Procedure Laterality Date   • CHOLECYSTECTOMY     • WRIST FRACTURE SURGERY Right 1982      Family History:     Family History   Problem Relation Age of Onset   • Heart disease Mother    • Hypertension Mother    • Diabetes Mother    • Heart disease Father    • Cervical cancer Sister       Social History:     Social History     Socioeconomic History   • Marital status:      Spouse name: None   • Number of children: 2   • Years of education: None   • Highest education level: None   Occupational History   • Occupation: RETIRED    Tobacco Use   • Smoking status: Never   • Smokeless tobacco: Never   Vaping Use   • Vaping Use: Never used   Substance and Sexual Activity   • Alcohol use: Yes     Alcohol/week: 1.0 standard drink of alcohol     Types: 1 Glasses of wine per week     Comment: 1 - 2 per week    • Drug use: Never   • Sexual activity: Not Currently   Other Topics Concern   • None   Social History Narrative   • None     Social Determinants of Health     Financial Resource Strain: Low Risk  (11/7/2023)    Overall Financial Resource Strain (CARDIA)    • Difficulty of Paying Living Expenses: Not hard at all   Food Insecurity: Not on file   Transportation Needs: No Transportation Needs (11/7/2023)    PRAPARE - Transportation    • Lack of Transportation (Medical): No    • Lack of Transportation (Non-Medical):  No   Physical Activity: Not on file   Stress: Not on file   Social Connections: Not on file   Intimate Partner Violence: Not on file   Housing Stability: Not on file      Medications and Allergies:     Current Outpatient Medications   Medication Sig Dispense Refill   • amLODIPine (NORVASC) 2.5 mg tablet Take 1 tablet (2.5 mg total) by mouth daily 5 + 2.5 for a total of 7.5 mg 90 tablet 1   • amLODIPine (NORVASC) 5 mg tablet TAKE ONE TABLET BY MOUTH ONE TIME DAILY. TAKE IN ADDITION TO THE 2.5 MG TABLET FOR A TOTAL DAILY DOSE: 7.5 MG 90 tablet 1   • atorvastatin (LIPITOR) 20 mg tablet Take 1 tablet (20 mg total) by mouth daily. 90 tablet 0   • docusate sodium (COLACE) 100 mg capsule Take 100 mg by mouth 2 (two) times a day     • glucose blood (OneTouch Ultra) test strip Check blood sugars once daily. Please substitute with appropriate alternative as covered by patient's insurance. Dx: E11.65 100 each 3   • losartan (COZAAR) 50 mg tablet Take 1 tablet by mouth 2 times a day 180 tablet 0   • metFORMIN (GLUCOPHAGE-XR) 500 mg 24 hr tablet Take 1 tablet by mouth 2 times a day 180 tablet 0   • multivitamin (THERAGRAN) TABS Take 1 tablet by mouth daily     • Omega-3 Fatty Acids (fish oil) 1,000 mg Take 1,000 mg by mouth daily     • OneTouch Delica Lancets 83N MISC Check blood sugars once daily. Please substitute with appropriate alternative as covered by patient's insurance. Dx: E11.65 100 each 3     No current facility-administered medications for this visit.      No Known Allergies   Immunizations:     Immunization History   Administered Date(s) Administered   • COVID-19 MODERNA VACC 0.5 ML IM 02/04/2021, 03/04/2021, 11/05/2021, 06/28/2022   • COVID-19 Moderna mRNA Vaccine 12 Yr+ 50 mcg/0.5 mL (Spikevax) 10/06/2023   • COVID-19, unspecified 02/04/2021, 03/04/2021, 11/05/2021   • INFLUENZA 10/01/2019, 10/13/2020, 10/05/2021, 10/07/2022   • Influenza, high dose seasonal 0.7 mL 10/07/2022, 10/06/2023   • Pneumococcal Conjugate 13-Valent 08/27/2020   • Pneumococcal Polysaccharide PPV23 08/01/2019   • Tdap 06/15/2022   • Zoster 01/10/2020, 04/10/2020   • Zoster Vaccine Recombinant 12/28/2019, 03/04/2020      Health Maintenance:         Topic Date Due   • DXA SCAN  09/14/2023   • Hepatitis C Screening  Completed     There are no preventive care reminders to display for this patient. Medicare Screening Tests and Risk Assessments:     Susan Rios is here for her Subsequent Wellness visit. Health Risk Assessment:   Patient rates overall health as very good. Patient feels that their physical health rating is same. Patient is satisfied with their life. Eyesight was rated as slightly worse. Hearing was rated as same. Patient feels that their emotional and mental health rating is same. Patients states they are never, rarely angry. Patient states they are sometimes unusually tired/fatigued. Pain experienced in the last 7 days has been none. Patient states that she has experienced weight loss or gain in last 6 months. Depression Screening:   PHQ-2 Score: 6  PHQ-9 Score: 16      Fall Risk Screening: In the past year, patient has experienced: no history of falling in past year      Urinary Incontinence Screening:   Patient has leaked urine accidently in the last six months. Home Safety:  Patient does not have trouble with stairs inside or outside of their home. Patient has working smoke alarms and has working carbon monoxide detector. Home safety hazards include: none. Nutrition:   Current diet is Regular. Medications:   Patient is currently taking over-the-counter supplements. OTC medications include: see medication list. Patient is able to manage medications. Activities of Daily Living (ADLs)/Instrumental Activities of Daily Living (IADLs):   Walk and transfer into and out of bed and chair?: Yes  Dress and groom yourself?: Yes    Bathe or shower yourself?: Yes    Feed yourself?  Yes  Do your laundry/housekeeping?: Yes  Manage your money, pay your bills and track your expenses?: Yes  Make your own meals?: Yes    Do your own shopping?: Yes    Previous Hospitalizations: Any hospitalizations or ED visits within the last 12 months?: No      Advance Care Planning:   Living will: Yes    Advanced directive: Yes    Advanced directive counseling given: Yes    End of Life Decisions reviewed with patient: Yes      Comments: DNR. Medical GURDEEP Sheppard (& [facility] [place]). Will bring in the copy. Cognitive Screening:   Provider or family/friend/caregiver concerned regarding cognition?: No    PREVENTIVE SCREENINGS      Cardiovascular Screening:    General: Screening Not Indicated and History Lipid Disorder      Diabetes Screening:     General: Screening Not Indicated and History Diabetes      Cervical Cancer Screening:    General: Screening Not Indicated      Lung Cancer Screening:     General: Screening Not Indicated      Hepatitis C Screening:    General: Screening Current    Screening, Brief Intervention, and Referral to Treatment (SBIRT)    Screening  Typical number of drinks in a day: 0  Typical number of drinks in a week: 1  Interpretation: Low risk drinking behavior. Single Item Drug Screening:  How often have you used an illegal drug (including marijuana) or a prescription medication for non-medical reasons in the past year? never    Single Item Drug Screen Score: 0  Interpretation: Negative screen for possible drug use disorder    No results found. Physical Exam:     /62   Pulse 81   Resp 14   Ht 5' (1.524 m)   Wt 69.4 kg (153 lb)   SpO2 97%   BMI 29.88 kg/m²     Physical Exam  Cardiovascular:      Pulses: no weak pulses          Dorsalis pedis pulses are 2+ on the right side and 2+ on the left side. Feet:      Right foot:      Skin integrity: No ulcer, skin breakdown, erythema, warmth, callus or dry skin. Left foot:      Skin integrity: No ulcer, skin breakdown, erythema, warmth, callus or dry skin.           Angela Cogan,

## 2023-11-09 NOTE — ASSESSMENT & PLAN NOTE
Improved from prior. Pt walking more. Has lost 9 pounds since last visit - she is happy about this. Daughter and pt both not concerned or surprised by the weight loss. Continue metformin 500 mg twice a day. Schedule eye exam. On ARB and statin. Update fasting blood work prior to follow up.    Lab Results   Component Value Date    HGBA1C 6.5 (H) 11/01/2023

## 2023-11-09 NOTE — ASSESSMENT & PLAN NOTE
Lab Results   Component Value Date    EGFR 59 11/01/2023    EGFR 53 05/01/2023    EGFR 55 04/14/2023    CREATININE 0.93 11/01/2023    CREATININE 1.01 05/01/2023    CREATININE 0.98 04/14/2023   Well controlled. Update urine microalbumin:creatinine ratio before follow up. On ARB.

## 2023-11-09 NOTE — ASSESSMENT & PLAN NOTE
Confirmed today. DNR. Medical POA Penny [facility] (& [facility] [place] Will bring in the copy.

## 2023-11-15 NOTE — TELEPHONE ENCOUNTER
11/15/23 10:15 AM     [employer] SmartForm used to document caregap status.     Rodrigo Streeter MA Lm to schedule consult  1st attempt

## 2023-11-16 DIAGNOSIS — E78.00 HYPERCHOLESTEROLEMIA: ICD-10-CM

## 2023-11-16 RX ORDER — ATORVASTATIN CALCIUM 20 MG/1
20 TABLET, FILM COATED ORAL DAILY
Qty: 90 TABLET | Refills: 0 | Status: SHIPPED | OUTPATIENT
Start: 2023-11-16

## 2023-12-30 DIAGNOSIS — I10 BENIGN ESSENTIAL HTN: ICD-10-CM

## 2023-12-30 DIAGNOSIS — R73.01 IFG (IMPAIRED FASTING GLUCOSE): ICD-10-CM

## 2024-01-02 RX ORDER — LOSARTAN POTASSIUM 50 MG/1
50 TABLET ORAL 2 TIMES DAILY
Qty: 180 TABLET | Refills: 1 | Status: SHIPPED | OUTPATIENT
Start: 2024-01-02

## 2024-01-25 DIAGNOSIS — R73.01 IFG (IMPAIRED FASTING GLUCOSE): ICD-10-CM

## 2024-01-25 RX ORDER — METFORMIN HYDROCHLORIDE 500 MG/1
500 TABLET, EXTENDED RELEASE ORAL 2 TIMES DAILY
Qty: 180 TABLET | Refills: 1 | Status: SHIPPED | OUTPATIENT
Start: 2024-01-25

## 2024-01-29 LAB
LEFT EYE DIABETIC RETINOPATHY: NORMAL
RIGHT EYE DIABETIC RETINOPATHY: NORMAL

## 2024-02-10 DIAGNOSIS — E78.00 HYPERCHOLESTEROLEMIA: ICD-10-CM

## 2024-02-10 RX ORDER — ATORVASTATIN CALCIUM 20 MG/1
20 TABLET, FILM COATED ORAL DAILY
Qty: 90 TABLET | Refills: 0 | Status: SHIPPED | OUTPATIENT
Start: 2024-02-10

## 2024-04-03 ENCOUNTER — HOSPITAL ENCOUNTER (OUTPATIENT)
Facility: HOSPITAL | Age: 79
Discharge: HOME | End: 2024-04-03
Payer: MEDICARE

## 2024-04-03 VITALS — WEIGHT: 153 LBS | BODY MASS INDEX: 30.04 KG/M2 | HEIGHT: 60 IN

## 2024-04-03 DIAGNOSIS — Z78.0 POST-MENOPAUSE: ICD-10-CM

## 2024-04-03 DIAGNOSIS — M85.89 OSTEOPENIA OF MULTIPLE SITES: ICD-10-CM

## 2024-04-03 PROCEDURE — 77080 DXA BONE DENSITY AXIAL: CPT

## 2024-04-13 DIAGNOSIS — I10 BENIGN ESSENTIAL HTN: ICD-10-CM

## 2024-04-13 RX ORDER — AMLODIPINE BESYLATE 5 MG/1
TABLET ORAL
Qty: 90 TABLET | Refills: 1 | Status: SHIPPED | OUTPATIENT
Start: 2024-04-13

## 2024-04-17 ENCOUNTER — APPOINTMENT (OUTPATIENT)
Dept: LAB | Age: 79
End: 2024-04-17
Payer: MEDICARE

## 2024-04-17 DIAGNOSIS — E11.22 TYPE 2 DIABETES MELLITUS WITH STAGE 3A CHRONIC KIDNEY DISEASE, WITHOUT LONG-TERM CURRENT USE OF INSULIN (HCC): ICD-10-CM

## 2024-04-17 DIAGNOSIS — E03.9 ACQUIRED HYPOTHYROIDISM: ICD-10-CM

## 2024-04-17 DIAGNOSIS — N18.31 TYPE 2 DIABETES MELLITUS WITH STAGE 3A CHRONIC KIDNEY DISEASE, WITHOUT LONG-TERM CURRENT USE OF INSULIN (HCC): ICD-10-CM

## 2024-04-17 LAB
ALBUMIN SERPL BCP-MCNC: 4.2 G/DL (ref 3.5–5)
ALP SERPL-CCNC: 55 U/L (ref 34–104)
ALT SERPL W P-5'-P-CCNC: 31 U/L (ref 7–52)
ANION GAP SERPL CALCULATED.3IONS-SCNC: 11 MMOL/L (ref 4–13)
AST SERPL W P-5'-P-CCNC: 26 U/L (ref 13–39)
BILIRUB SERPL-MCNC: 0.56 MG/DL (ref 0.2–1)
BUN SERPL-MCNC: 15 MG/DL (ref 5–25)
CALCIUM SERPL-MCNC: 9.6 MG/DL (ref 8.4–10.2)
CHLORIDE SERPL-SCNC: 100 MMOL/L (ref 96–108)
CHOLEST SERPL-MCNC: 130 MG/DL
CO2 SERPL-SCNC: 28 MMOL/L (ref 21–32)
CREAT SERPL-MCNC: 0.85 MG/DL (ref 0.6–1.3)
CREAT UR-MCNC: 122.6 MG/DL
GFR SERPL CREATININE-BSD FRML MDRD: 65 ML/MIN/1.73SQ M
GLUCOSE P FAST SERPL-MCNC: 114 MG/DL (ref 65–99)
HDLC SERPL-MCNC: 66 MG/DL
LDLC SERPL CALC-MCNC: 49 MG/DL (ref 0–100)
MICROALBUMIN UR-MCNC: 13.4 MG/L
MICROALBUMIN/CREAT 24H UR: 11 MG/G CREATININE (ref 0–30)
NONHDLC SERPL-MCNC: 64 MG/DL
POTASSIUM SERPL-SCNC: 3.9 MMOL/L (ref 3.5–5.3)
PROT SERPL-MCNC: 7.3 G/DL (ref 6.4–8.4)
SODIUM SERPL-SCNC: 139 MMOL/L (ref 135–147)
T3FREE SERPL-MCNC: 2.26 PG/ML (ref 2.5–3.9)
T4 FREE SERPL-MCNC: 0.73 NG/DL (ref 0.61–1.12)
TRIGL SERPL-MCNC: 73 MG/DL
TSH SERPL DL<=0.05 MIU/L-ACNC: 5.06 UIU/ML (ref 0.45–4.5)

## 2024-04-17 PROCEDURE — 84481 FREE ASSAY (FT-3): CPT

## 2024-04-17 PROCEDURE — 84443 ASSAY THYROID STIM HORMONE: CPT

## 2024-04-17 PROCEDURE — 83036 HEMOGLOBIN GLYCOSYLATED A1C: CPT

## 2024-04-17 PROCEDURE — 82043 UR ALBUMIN QUANTITATIVE: CPT

## 2024-04-17 PROCEDURE — 82570 ASSAY OF URINE CREATININE: CPT

## 2024-04-17 PROCEDURE — 80061 LIPID PANEL: CPT

## 2024-04-17 PROCEDURE — 80053 COMPREHEN METABOLIC PANEL: CPT

## 2024-04-17 PROCEDURE — 84439 ASSAY OF FREE THYROXINE: CPT

## 2024-04-17 PROCEDURE — 36415 COLL VENOUS BLD VENIPUNCTURE: CPT

## 2024-04-18 LAB
EST. AVERAGE GLUCOSE BLD GHB EST-MCNC: 140 MG/DL
HBA1C MFR BLD: 6.5 %

## 2024-04-19 ENCOUNTER — TELEPHONE (OUTPATIENT)
Dept: ADMINISTRATIVE | Facility: OTHER | Age: 79
End: 2024-04-19

## 2024-04-19 NOTE — TELEPHONE ENCOUNTER
04/19/24 2:09 PM    Patient contacted (spoke with patient) to bring Advance Directive, POLST, or Living Will document to next scheduled pcp visit.    Thank you.  Skip Lei  [facility]

## 2024-05-09 DIAGNOSIS — E78.00 HYPERCHOLESTEROLEMIA: ICD-10-CM

## 2024-05-09 RX ORDER — ATORVASTATIN CALCIUM 20 MG/1
20 TABLET, FILM COATED ORAL DAILY
Qty: 90 TABLET | Refills: 0 | Status: SHIPPED | OUTPATIENT
Start: 2024-05-09

## 2024-06-05 ENCOUNTER — TELEPHONE (OUTPATIENT)
Dept: ADMINISTRATIVE | Facility: OTHER | Age: 79
End: 2024-06-05

## 2024-06-05 NOTE — TELEPHONE ENCOUNTER
06/05/24 9:25 AM    Patient contacted to bring Advance Directive, POLST, or Living Will document to next scheduled pcp visit.[facility] Department spoke with patient.    Thank you.  Dannie Uribe MA  [facility]

## 2024-06-06 ENCOUNTER — OFFICE VISIT (OUTPATIENT)
Dept: FAMILY MEDICINE CLINIC | Facility: CLINIC | Age: 79
End: 2024-06-06
Payer: MEDICARE

## 2024-06-06 ENCOUNTER — TELEPHONE (OUTPATIENT)
Age: 79
End: 2024-06-06

## 2024-06-06 ENCOUNTER — TELEPHONE (OUTPATIENT)
Dept: ADMINISTRATIVE | Facility: OTHER | Age: 79
End: 2024-06-06

## 2024-06-06 VITALS
RESPIRATION RATE: 16 BRPM | OXYGEN SATURATION: 98 % | DIASTOLIC BLOOD PRESSURE: 58 MMHG | SYSTOLIC BLOOD PRESSURE: 122 MMHG | HEIGHT: 60 IN | HEART RATE: 72 BPM | BODY MASS INDEX: 29.06 KG/M2 | TEMPERATURE: 98.4 F | WEIGHT: 148 LBS

## 2024-06-06 DIAGNOSIS — N18.31 STAGE 3A CHRONIC KIDNEY DISEASE (HCC): ICD-10-CM

## 2024-06-06 DIAGNOSIS — R25.1 TREMOR: Primary | ICD-10-CM

## 2024-06-06 DIAGNOSIS — E11.22 TYPE 2 DIABETES MELLITUS WITH STAGE 3A CHRONIC KIDNEY DISEASE, WITHOUT LONG-TERM CURRENT USE OF INSULIN (HCC): ICD-10-CM

## 2024-06-06 DIAGNOSIS — F33.9 DEPRESSION, RECURRENT (HCC): ICD-10-CM

## 2024-06-06 DIAGNOSIS — E78.00 HYPERCHOLESTEROLEMIA: ICD-10-CM

## 2024-06-06 DIAGNOSIS — E03.9 ACQUIRED HYPOTHYROIDISM: ICD-10-CM

## 2024-06-06 DIAGNOSIS — I10 BENIGN ESSENTIAL HTN: ICD-10-CM

## 2024-06-06 DIAGNOSIS — R41.3 MEMORY LOSS: ICD-10-CM

## 2024-06-06 DIAGNOSIS — N18.31 TYPE 2 DIABETES MELLITUS WITH STAGE 3A CHRONIC KIDNEY DISEASE, WITHOUT LONG-TERM CURRENT USE OF INSULIN (HCC): ICD-10-CM

## 2024-06-06 DIAGNOSIS — R26.9 GAIT ABNORMALITY: ICD-10-CM

## 2024-06-06 PROCEDURE — G2211 COMPLEX E/M VISIT ADD ON: HCPCS | Performed by: FAMILY MEDICINE

## 2024-06-06 PROCEDURE — 99215 OFFICE O/P EST HI 40 MIN: CPT | Performed by: FAMILY MEDICINE

## 2024-06-06 NOTE — LETTER
Diabetic Eye Exam Form    Date Requested: 24  Patient: Chelsea Bowles  Patient : 1945   Referring Provider: Fabiola Le DO      DIABETIC Eye Exam Date _______________________________      Type of Exam MUST be documented for Diabetic Eye Exams. Please CHECK ONE.     Retinal Exam       Dilated Retinal Exam       OCT       Optomap-Iris Exam      Fundus Photography       Left Eye - Please check Retinopathy or No Retinopathy        Exam did show retinopathy    Exam did not show retinopathy       Right Eye - Please check Retinopathy or No Retinopathy       Exam did show retinopathy    Exam did not show retinopathy       Comments __________________________________________________________    Practice Providing Exam ______________________________________________    Exam Performed By (print name) _______________________________________      Provider Signature ___________________________________________________      These reports are needed for  compliance.  Please fax this completed form and a copy of the Diabetic Eye Exam report to our office located at [address] [place] [address] as soon as possible via Fax [phone] attention [place]: Phone [phone]  We thank you for your assistance in treating our mutual patient.    [facility] - [phone] F [phone]

## 2024-06-06 NOTE — TELEPHONE ENCOUNTER
Nanci from [facility] called in stating they received a medical record request and they want to know if you're looking for anything specific with labs and imaging. She's requesting a call back.

## 2024-06-06 NOTE — LETTER
Diabetic Eye Exam Form    Date Requested: 24  Patient: Chelsea Bowles  Patient : 1945   Referring Provider: Fabiola Le DO      DIABETIC Eye Exam Date _______________________________      Type of Exam MUST be documented for Diabetic Eye Exams. Please CHECK ONE.     Retinal Exam       Dilated Retinal Exam       OCT       Optomap-Iris Exam      Fundus Photography       Left Eye - Please check Retinopathy or No Retinopathy        Exam did show retinopathy    Exam did not show retinopathy       Right Eye - Please check Retinopathy or No Retinopathy       Exam did show retinopathy    Exam did not show retinopathy       Comments __________________________________________________________    Practice Providing Exam ______________________________________________    Exam Performed By (print name) _______________________________________      Provider Signature ___________________________________________________      These reports are needed for  compliance.  Please fax this completed form and a copy of the Diabetic Eye Exam report to our office located at [address] [place] [address] as soon as possible via Fax [phone] attention [place]: Phone [phone]  We thank you for your assistance in treating our mutual patient.   [facility] - [phone] F [phone]

## 2024-06-06 NOTE — PROGRESS NOTES
1. Tremor  -     RPR-Syphilis Screening (Total Syphilis IGG/IGM); Future; Expected date: 10/15/2024  -     Folate; Future; Expected date: 10/15/2024  -     Iron Panel (Includes Ferritin, Iron Sat%, Iron, and TIBC); Future; Expected date: 10/15/2024  -     Comprehensive metabolic panel; Future; Expected date: 10/15/2024  -     Lipid panel; Future; Expected date: 10/15/2024  -     Albumin / creatinine urine ratio; Future; Expected date: 10/15/2024  -     Hemoglobin A1C; Future; Expected date: 10/15/2024  -     TSH, 3rd generation; Future; Expected date: 10/15/2024  2. Memory loss  -     Ambulatory Referral to Neurology; Future  -     Ambulatory Referral to Senior Care; Future  -     Vitamin B12  -     RPR-Syphilis Screening (Total Syphilis IGG/IGM); Future; Expected date: 10/15/2024  -     Folate; Future; Expected date: 10/15/2024  -     Iron Panel (Includes Ferritin, Iron Sat%, Iron, and TIBC); Future; Expected date: 10/15/2024  -     Comprehensive metabolic panel; Future; Expected date: 10/15/2024  -     Lipid panel; Future; Expected date: 10/15/2024  -     Albumin / creatinine urine ratio; Future; Expected date: 10/15/2024  -     Hemoglobin A1C; Future; Expected date: 10/15/2024  -     TSH, 3rd generation; Future; Expected date: 10/15/2024  3. Gait abnormality  -     Ambulatory Referral to Neurology; Future  -     Ambulatory Referral to Senior Care; Future  -     Vitamin B12  -     RPR-Syphilis Screening (Total Syphilis IGG/IGM); Future; Expected date: 10/15/2024  -     Folate; Future; Expected date: 10/15/2024  -     Iron Panel (Includes Ferritin, Iron Sat%, Iron, and TIBC); Future; Expected date: 10/15/2024  -     Comprehensive metabolic panel; Future; Expected date: 10/15/2024  -     Lipid panel; Future; Expected date: 10/15/2024  -     Albumin / creatinine urine ratio; Future; Expected date: 10/15/2024  -     Hemoglobin A1C; Future; Expected date: 10/15/2024  -     TSH, 3rd generation; Future; Expected  date: 10/15/2024  4. Depression, recurrent (HCC)  5. Type 2 diabetes mellitus with stage 3a chronic kidney disease, without long-term current use of insulin (HCC)  -     RPR-Syphilis Screening (Total Syphilis IGG/IGM); Future; Expected date: 10/15/2024  -     Folate; Future; Expected date: 10/15/2024  -     Iron Panel (Includes Ferritin, Iron Sat%, Iron, and TIBC); Future; Expected date: 10/15/2024  -     Comprehensive metabolic panel; Future; Expected date: 10/15/2024  -     Lipid panel; Future; Expected date: 10/15/2024  -     Albumin / creatinine urine ratio; Future; Expected date: 10/15/2024  -     Hemoglobin A1C; Future; Expected date: 10/15/2024  -     TSH, 3rd generation; Future; Expected date: 10/15/2024  6. Benign essential HTN  -     RPR-Syphilis Screening (Total Syphilis IGG/IGM); Future; Expected date: 10/15/2024  -     Folate; Future; Expected date: 10/15/2024  -     Iron Panel (Includes Ferritin, Iron Sat%, Iron, and TIBC); Future; Expected date: 10/15/2024  -     Comprehensive metabolic panel; Future; Expected date: 10/15/2024  -     Lipid panel; Future; Expected date: 10/15/2024  -     Albumin / creatinine urine ratio; Future; Expected date: 10/15/2024  -     Hemoglobin A1C; Future; Expected date: 10/15/2024  -     TSH, 3rd generation; Future; Expected date: 10/15/2024  7. Hypercholesterolemia  -     RPR-Syphilis Screening (Total Syphilis IGG/IGM); Future; Expected date: 10/15/2024  -     Folate; Future; Expected date: 10/15/2024  -     Iron Panel (Includes Ferritin, Iron Sat%, Iron, and TIBC); Future; Expected date: 10/15/2024  -     Comprehensive metabolic panel; Future; Expected date: 10/15/2024  -     Lipid panel; Future; Expected date: 10/15/2024  -     Albumin / creatinine urine ratio; Future; Expected date: 10/15/2024  -     Hemoglobin A1C; Future; Expected date: 10/15/2024  -     TSH, 3rd generation; Future; Expected date: 10/15/2024  8. Stage 3a chronic kidney disease (HCC)  -      "RPR-Syphilis Screening (Total Syphilis IGG/IGM); Future; Expected date: 10/15/2024  -     Folate; Future; Expected date: 10/15/2024  -     Iron Panel (Includes Ferritin, Iron Sat%, Iron, and TIBC); Future; Expected date: 10/15/2024  -     Comprehensive metabolic panel; Future; Expected date: 10/15/2024  -     Lipid panel; Future; Expected date: 10/15/2024  -     Albumin / creatinine urine ratio; Future; Expected date: 10/15/2024  -     Hemoglobin A1C; Future; Expected date: 10/15/2024  -     TSH, 3rd generation; Future; Expected date: 10/15/2024  9. Acquired hypothyroidism  -     RPR-Syphilis Screening (Total Syphilis IGG/IGM); Future; Expected date: 10/15/2024  -     Folate; Future; Expected date: 10/15/2024  -     Iron Panel (Includes Ferritin, Iron Sat%, Iron, and TIBC); Future; Expected date: 10/15/2024  -     Comprehensive metabolic panel; Future; Expected date: 10/15/2024  -     Lipid panel; Future; Expected date: 10/15/2024  -     Albumin / creatinine urine ratio; Future; Expected date: 10/15/2024  -     Hemoglobin A1C; Future; Expected date: 10/15/2024  -     TSH, 3rd generation; Future; Expected date: 10/15/2024     Get records from ER in April   Sugars well controlled on log and with A1C. We requested DM eye exam   See scanned BP LOG - blood pressure low. Stop norvasc 2.5 mg. Continue 5 mg. Continue weaning blood pressure meds until blood pressure closer to 130-135/80-85 and see if this helps any symptoms.   For \"episode\" ?seizure (no history)  ?start Parkinsons (no cogwheel rigidity on exam) ?related to dementia ?TIA (not one sided)   If this occurs again to ER   Start cognitive therapy for memory   Reviewed April fasting blood work. More prior to next visit.   After reviewing ER visit and blood work consider sooner follow up     5 minutes spent on chart prep, 45 minutes spent with patient counseling/educating on their diagnoses, tests completed and any new tests ordered, any referrals placed, treatment " options, and documentation of above today.   In prescribing new medications, or changing doses, we reviewed the risks and benefits and side effects of these medications along with other treatment options if appropriate.       Patient Instructions   Stop norvasc 2.5 mg   Continue norvasc 5 mg   Send me blood pressure in about a month  Schedule with neurology   Let me know if you want to start speech therapy for the memory (this is who does therapy)   For your warts - Try a salicylic acid tape/bandage like Mediplast. apply to wart/callus. Leave on 48 - 72 hours.  Keep dry, apply waterproof tape if needed. Remove. Restart if needed.        Return in about 5 months (around 11/6/2024) for medicare wellness visit & labs .    Subjective:   Chelsea is a 78 y.o. female here today for a follow-up on her current medical conditions:    Patient Active Problem List   Diagnosis   • Benign essential HTN   • Type 2 diabetes mellitus with stage 3a chronic kidney disease, without long-term current use of insulin (HCC)   • Osteopenia of multiple sites   • Hypercholesterolemia   • Stage 3a chronic kidney disease (HCC)   • Counseling regarding advanced directives   • Acquired hypothyroidism   • Depression, recurrent (HCC)       Patient Care Team:  Fabiola Le DO as PCP - General (Family Medicine)    Current Medications:  Current Outpatient Medications   Medication Sig Dispense Refill   • amLODIPine (NORVASC) 2.5 mg tablet Take 1 tablet (2.5 mg total) by mouth daily 5 + 2.5 for a total of 7.5 mg 90 tablet 1   • amLODIPine (NORVASC) 5 mg tablet take 1 tablet by mouth one time daily. take in addition to the 2.5mg tablet for total daily does:7.5mg 90 tablet 1   • atorvastatin (LIPITOR) 20 mg tablet TAKE ONE TABLET BY MOUTH ONE TIME DAILY 90 tablet 0   • docusate sodium (COLACE) 100 mg capsule Take 100 mg by mouth 2 (two) times a day     • glucose blood (OneTouch Ultra) test strip Check blood sugars once daily. Please substitute with  appropriate alternative as covered by patient's insurance. Dx: E11.65 100 each 3   • losartan (COZAAR) 50 mg tablet TAKE ONE TABLET BY MOUTH TWICE DAILY 180 tablet 1   • metFORMIN (GLUCOPHAGE-XR) 500 mg 24 hr tablet TAKE ONE TABLET BY MOUTH TWICE DAILY 180 tablet 1   • multivitamin (THERAGRAN) TABS Take 1 tablet by mouth daily     • Omega-3 Fatty Acids (fish oil) 1,000 mg Take 1,000 mg by mouth daily     • OneTouch Delica Lancets 33G MISC Check blood sugars once daily. Please substitute with appropriate alternative as covered by patient's insurance. Dx: E11.65 100 each 3     No current facility-administered medications for this visit.       HPI:  Chief Complaint   Patient presents with   • Follow-up     Patient was in the ER in April and had some work up done - patient went to the ER because her BP was high, had tremors, and generalized weakness, confusion. Tx for potential UTI   • Memory Loss     -- Above per clinical staff and reviewed. --    PHQ-2/9 Depression Screening         ?     [facility] due 11/7/24 April 2024 labs glu 114, HbA1C    , GFR 59 to 65, TSH 3.415 to 5.059, free T3 2.26, free T4 0.73, urine microalbumin:creatinine ratio negative. Chol 130, TG 73, HDL 66, LDL 49,   Not driving, not cooking other than microwave  Banking, laundry, feeding herself, feeding cat, routine, ladies night    Nov 2023 labs HbA1C 6.8 to 6.5 , TSH 5.7 to 3.415, , GFR 59  9/21/22 TSH from 4.7 to 0.013, to 3.60   march 2022 labs done - statin, synthorid started. watch potassium. expecting new gr  anddtr in august.   New pt - shared record summary reviewed. labs HbA1C 9/14/21 6.1%, liids 5/22/21   , HDL 74 , TG 72, Cr 0.92, urine: cr neg GFR 54  . dtr Blanca Posadas   no Tdap found on records we received. (slw 4/12/22)   Today:  Does not eat a lot - routine small portions   Lost some weight   No dizziness   Short intermittent walks   No chest pain, diaphoresis, shortness of breath or palpitations   No leg pain    [facility] DM eye exam   Colace twice a day forever   Low dose thyroid level in the past, but then lowered her too much   Meningioma dx at ER   Slower talking, walking   No shaking noted other than during the tremor episode     In April was visiting her other daughter Ilya Agustin in [place]  Blood pressure high, sugar good, some confusion, generalized tremors, facial twitching, weakness - could not get out of chair  CTA, MRI, duplex, labs , UA leuks only (culture negative)       The following portions of the patient's history were reviewed and updated as appropriate: allergies, current medications, past family history, past medical history, past social history, past surgical history and problem list.    Objective:  Vitals:  /58 (BP Location: Left arm, Patient Position: Sitting, Cuff Size: Standard)   Pulse 72   Temp 98.4 °F (36.9 °C) (Temporal)   Resp 16   Ht 5' (1.524 m)   Wt 67.1 kg (148 lb)   SpO2 98%   BMI 28.90 kg/m²    Wt Readings from Last 3 Encounters:   06/06/24 67.1 kg (148 lb)   04/03/24 69.4 kg (153 lb)   11/07/23 69.4 kg (153 lb)      BP Readings from Last 3 Encounters:   06/06/24 122/58   11/07/23 124/62   04/10/23 124/70        Review of Systems   She has no other concerns. + unexpected weight changes. No chest pain, SOB, or palpitations. No GERD. No changes in bowels - usual constipation.  or bladder. Sleeping well. No mood changes. During episode weakness, tremor. Worsening memory, slowing down.     Physical Exam   Constitutional:  she appears well-developed and well-nourished.  HENT: Head: Normocephalic.   Neck: Neck supple.   Cardiovascular: Normal rate, regular rhythm and normal heart sounds.   Pulmonary/Chest: Effort normal and breath sounds normal. No wheezes, rales, or rhonchi.   Abdominal: Soft. Bowel sounds are normal. There is no tenderness. No hepatosplenomegaly.   Musculoskeletal: she exhibits no edema.   Lymphadenopathy: she has no cervical adenopathy.   Neurological:  she is alert and oriented to person, place, and time.   Skin: Skin is warm and dry.   Psychiatric: she has a normal mood and affect. her behavior is normal. Thought content normal.

## 2024-06-06 NOTE — PATIENT INSTRUCTIONS
Stop norvasc 2.5 mg   Continue norvasc 5 mg   Send me blood pressure in about a month  Schedule with neurology   Let me know if you want to start speech therapy for the memory (this is who does therapy)   For your warts - Try a salicylic acid tape/bandage like Mediplast. apply to wart/callus. Leave on 48 - 72 hours.  Keep dry, apply waterproof tape if needed. Remove. Restart if needed.

## 2024-06-06 NOTE — PROGRESS NOTES
Diabetic Foot Exam    Patient's shoes and socks removed.    Right Foot/Ankle   Right Foot Inspection  Skin Exam: skin normal and skin intact. No dry skin, no warmth, no callus, no erythema, no maceration, no abnormal color, no pre-ulcer, no ulcer and no callus.     Toe Exam: ROM and strength within normal limits.     Sensory   Monofilament testing: intact    Vascular  Capillary refills: < 3 seconds  The right DP pulse is 2+. The right PT pulse is 2+.     Left Foot/Ankle  Left Foot Inspection  Skin Exam: skin normal and skin intact. No dry skin, no warmth, no erythema, no maceration, normal color, no pre-ulcer, no ulcer and no callus.     Toe Exam: ROM and strength within normal limits.     Sensory   Monofilament testing: intact    Vascular  Capillary refills: < 3 seconds  The left DP pulse is 2+. The left PT pulse is 2+.     Assign Risk Category  Deformity present  No loss of protective sensation  No weak pulses  Risk: 0

## 2024-06-06 NOTE — TELEPHONE ENCOUNTER
----- Message from Tonia PARDO sent at 6/6/2024  9:01 AM EDT -----  Regarding: Care Gap Request  06/06/24 9:01 AM    Hello, our patient above has had Diabetic Eye Exam completed/performed. Please assist in updating the patient chart by making an External outreach to [facility] facility located in [place]. The date of service is 01-02/24.    Thank you,  HO Newman PG

## 2024-06-07 NOTE — TELEPHONE ENCOUNTER
Upon review of the In Basket request and the patient's chart, initial outreach has been made via fax to facility. Please see Contacts section for details.     Thank you  Vivien Rodas

## 2024-06-10 ENCOUNTER — VBI (OUTPATIENT)
Dept: ADMINISTRATIVE | Facility: OTHER | Age: 79
End: 2024-06-10

## 2024-06-10 ENCOUNTER — CONSULT (OUTPATIENT)
Age: 79
End: 2024-06-10
Payer: MEDICARE

## 2024-06-10 VITALS
SYSTOLIC BLOOD PRESSURE: 132 MMHG | TEMPERATURE: 97.6 F | HEART RATE: 67 BPM | HEIGHT: 60 IN | OXYGEN SATURATION: 98 % | WEIGHT: 150 LBS | DIASTOLIC BLOOD PRESSURE: 68 MMHG | BODY MASS INDEX: 29.45 KG/M2

## 2024-06-10 DIAGNOSIS — F02.A0 MILD LATE ONSET ALZHEIMER'S DEMENTIA WITHOUT BEHAVIORAL DISTURBANCE, PSYCHOTIC DISTURBANCE, MOOD DISTURBANCE, OR ANXIETY (HCC): Primary | ICD-10-CM

## 2024-06-10 DIAGNOSIS — N18.31 TYPE 2 DIABETES MELLITUS WITH STAGE 3A CHRONIC KIDNEY DISEASE, WITHOUT LONG-TERM CURRENT USE OF INSULIN (HCC): ICD-10-CM

## 2024-06-10 DIAGNOSIS — F33.9 DEPRESSION, RECURRENT (HCC): ICD-10-CM

## 2024-06-10 DIAGNOSIS — E03.9 ACQUIRED HYPOTHYROIDISM: ICD-10-CM

## 2024-06-10 DIAGNOSIS — E78.00 HYPERCHOLESTEROLEMIA: ICD-10-CM

## 2024-06-10 DIAGNOSIS — G30.1 MILD LATE ONSET ALZHEIMER'S DEMENTIA WITHOUT BEHAVIORAL DISTURBANCE, PSYCHOTIC DISTURBANCE, MOOD DISTURBANCE, OR ANXIETY (HCC): Primary | ICD-10-CM

## 2024-06-10 DIAGNOSIS — N18.31 STAGE 3A CHRONIC KIDNEY DISEASE (HCC): ICD-10-CM

## 2024-06-10 DIAGNOSIS — I10 BENIGN ESSENTIAL HTN: ICD-10-CM

## 2024-06-10 DIAGNOSIS — E11.22 TYPE 2 DIABETES MELLITUS WITH STAGE 3A CHRONIC KIDNEY DISEASE, WITHOUT LONG-TERM CURRENT USE OF INSULIN (HCC): ICD-10-CM

## 2024-06-10 DIAGNOSIS — R26.9 GAIT ABNORMALITY: ICD-10-CM

## 2024-06-10 PROBLEM — F01.A0 MILD VASCULAR DEMENTIA WITHOUT BEHAVIORAL DISTURBANCE, PSYCHOTIC DISTURBANCE, MOOD DISTURBANCE, OR ANXIETY (HCC): Status: ACTIVE | Noted: 2024-06-10

## 2024-06-10 PROCEDURE — 99205 OFFICE O/P NEW HI 60 MIN: CPT | Performed by: INTERNAL MEDICINE

## 2024-06-10 RX ORDER — SERTRALINE HCL 25 MG
25 TABLET ORAL DAILY
Qty: 90 TABLET | Refills: 2 | Status: SHIPPED | OUTPATIENT
Start: 2024-06-10

## 2024-06-10 NOTE — TELEPHONE ENCOUNTER
Upon review of the In Basket request we were able to locate, review, and update the patient chart as requested for Diabetic Eye Exam.    Any additional questions or concerns should be emailed to the Practice Liaisons via the appropriate education email address, please do not reply via In Basket.    Thank you  Eloina Ludwig PG [facility]

## 2024-06-10 NOTE — PROGRESS NOTES
[facility]  [address], [address]  [place] [address]  [phone]    Social Work Intake    Chelsea Bowles presents today for a geriatric assessment, accompanied by daughter-Blanca.     Social/Family History   Marital status:     Does patient have children? Yes How Many? 2 children  (If yes, where do the children live?) lives with daughter, other daughter lives 3 hrs away  Family members assisting patient at home: daughter   Are any relationships causing problems right now: No   Who is available to provide care in case of illness or crisis (please specify relation to patient / level of care able to provide)? 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 LSW also discussed benefits of adult day centers. Daughter also was interested in lifeline, and that patient sometimes has difficulty with her phone. LSW provided information on lifeline systems, and D[employer] memory phone.      Financial   Total Monthly income: unsure     Source of income: Social Security/Pension   Meet current needs? Yes     Funds available for home care? Yes     Funds available for nursing home?  Long-term care policy     Do you rent or own your home? Lives in daughter's home      End-Of-Life Checklist   Have wishes or desires  "for end-of-life care been discussed? POA & family working on 5 wishes with her      For all patients, we encourage seeing a  to establish a Financial Power of , a Health Care Power of , and an Advanced Directive (living will). Particularly for patients experiencing memory concerns, we strongly recommend that this is completed as soon as possible.       Safety Assessment   Is the patient still driving? No    Is the patient taking medications as prescribed? Yes     Is there a system in place for medication management? Daughter fills pillbox & reminds her to take them  Are firearms or weapons in the home? No  Recommendations per [employer]: removing them from the home, keep ammunition stored separately, encourage patient to consider \"gifting\" them, if necessary, ask local law enforcement for assistance in removing the firearms from the home. The family may receive compensation from a gun buy-back program.    Does the patient live alone? Lives with daughter & MAO & cat  What is the layout of the home? Multistory home, she is on 1 level   Do you feel comfortable leaving the person home alone? Yes    Have you noticed any burned pans or other signs of issues with the stove or other appliances? No   Do you have any concerns about the person’s cooking or eating habits?  Has lost weight, but eating meals per daughter    Are there working smoke detectors and fire extinguishers in the home? Yes    Have you thought about when it will no longer be safe for the patient to live alone? Remain in the home with homecare if needed     MoCA score: 12/30  Geriatric Depression Scale (GDS) score: 5/15.   "

## 2024-06-11 ENCOUNTER — HOME HEALTH ADMISSION (OUTPATIENT)
Dept: HOME HEALTH SERVICES | Facility: HOME HEALTHCARE | Age: 79
End: 2024-06-11
Payer: MEDICARE

## 2024-06-11 NOTE — TELEPHONE ENCOUNTER
Spoke with [facility] Records and they state they have sent imaging from the last year. The records request that was sent only requested imaging and if we would like any other records a new request will need to be submitted.

## 2024-06-11 NOTE — TELEPHONE ENCOUNTER
As a follow-up, a second attempt has been made for outreach via telephone call to facility. Please see Contacts section for details.    Thank you  Vivien Rodas

## 2024-06-13 PROBLEM — G30.1 MILD LATE ONSET ALZHEIMER'S DEMENTIA WITHOUT BEHAVIORAL DISTURBANCE, PSYCHOTIC DISTURBANCE, MOOD DISTURBANCE, OR ANXIETY (HCC): Status: ACTIVE | Noted: 2024-06-10

## 2024-06-13 PROBLEM — F02.A0 MILD LATE ONSET ALZHEIMER'S DEMENTIA WITHOUT BEHAVIORAL DISTURBANCE, PSYCHOTIC DISTURBANCE, MOOD DISTURBANCE, OR ANXIETY (HCC): Status: ACTIVE | Noted: 2024-06-10

## 2024-06-14 ENCOUNTER — HOME CARE VISIT (OUTPATIENT)
Dept: HOME HEALTH SERVICES | Facility: HOME HEALTHCARE | Age: 79
End: 2024-06-14
Payer: MEDICARE

## 2024-06-14 PROCEDURE — G0151 HHCP-SERV OF PT,EA 15 MIN: HCPCS

## 2024-06-14 PROCEDURE — 400013 VN SOC

## 2024-06-14 PROCEDURE — 10330081 VN NO-PAY CLAIM PROCEDURE

## 2024-06-15 VITALS — SYSTOLIC BLOOD PRESSURE: 130 MMHG | HEART RATE: 68 BPM | OXYGEN SATURATION: 97 % | DIASTOLIC BLOOD PRESSURE: 50 MMHG

## 2024-06-15 NOTE — CASE COMMUNICATION
"Medication discrepancies or Major drug interactions: atorvastatin Major drug food interaction  Abnormal clinical findings: moderately severe cognitive impairment  This report is informational only, no response is needed  [facility] has Admitted your patient to Home Health service with the following disciplines: PT  Patient stated goals of care: \"I want to be able to walk around the neighborhood safely.\"  Potential barriers to goal a chievement: carryover due to dementia  Primary focus of home health care:Neuro  Anticipated visit pattern and next visit date 1w2, 0w1, 1w1, 0w1, 1w1, 6/19/24  Thank you for allowing us to participate in the care of your patient.  "

## 2024-06-17 ENCOUNTER — HOME CARE VISIT (OUTPATIENT)
Dept: HOME HEALTH SERVICES | Facility: HOME HEALTHCARE | Age: 79
End: 2024-06-17
Payer: MEDICARE

## 2024-06-17 PROCEDURE — G0321 AUDIO-ONLY HHS: HCPCS

## 2024-06-18 NOTE — TELEPHONE ENCOUNTER
As a final attempt, a third outreach has been made via telephone call to facility. Please see Contacts section for details. This encounter will be closed and completed by end of day. Should we receive the requested information because of previous outreach attempts, the requested patient's chart will be updated appropriately.     Thank you  Vivien Rodas

## 2024-06-19 ENCOUNTER — HOME CARE VISIT (OUTPATIENT)
Dept: HOME HEALTH SERVICES | Facility: HOME HEALTHCARE | Age: 79
End: 2024-06-19
Payer: MEDICARE

## 2024-06-19 VITALS — SYSTOLIC BLOOD PRESSURE: 140 MMHG | DIASTOLIC BLOOD PRESSURE: 62 MMHG | OXYGEN SATURATION: 99 % | HEART RATE: 67 BPM

## 2024-06-19 PROCEDURE — G0151 HHCP-SERV OF PT,EA 15 MIN: HCPCS

## 2024-06-27 ENCOUNTER — HOME CARE VISIT (OUTPATIENT)
Dept: HOME HEALTH SERVICES | Facility: HOME HEALTHCARE | Age: 79
End: 2024-06-27
Payer: MEDICARE

## 2024-06-27 DIAGNOSIS — R73.01 IFG (IMPAIRED FASTING GLUCOSE): ICD-10-CM

## 2024-06-27 DIAGNOSIS — I10 BENIGN ESSENTIAL HTN: ICD-10-CM

## 2024-06-27 RX ORDER — LOSARTAN POTASSIUM 50 MG/1
50 TABLET ORAL 2 TIMES DAILY
Qty: 180 TABLET | Refills: 1 | Status: SHIPPED | OUTPATIENT
Start: 2024-06-27

## 2024-07-05 ENCOUNTER — HOME CARE VISIT (OUTPATIENT)
Dept: HOME HEALTH SERVICES | Facility: HOME HEALTHCARE | Age: 79
End: 2024-07-05
Payer: MEDICARE

## 2024-07-05 VITALS — SYSTOLIC BLOOD PRESSURE: 146 MMHG | DIASTOLIC BLOOD PRESSURE: 60 MMHG | HEART RATE: 65 BPM | OXYGEN SATURATION: 98 %

## 2024-07-05 PROCEDURE — G0151 HHCP-SERV OF PT,EA 15 MIN: HCPCS

## 2024-07-10 DIAGNOSIS — I10 BENIGN ESSENTIAL HTN: ICD-10-CM

## 2024-07-10 RX ORDER — AMLODIPINE BESYLATE 2.5 MG/1
TABLET ORAL
Qty: 100 TABLET | Refills: 1 | Status: SHIPPED | OUTPATIENT
Start: 2024-07-10

## 2024-07-15 ENCOUNTER — HOME CARE VISIT (OUTPATIENT)
Dept: HOME HEALTH SERVICES | Facility: HOME HEALTHCARE | Age: 79
End: 2024-07-15
Payer: MEDICARE

## 2024-07-15 VITALS — HEART RATE: 68 BPM | OXYGEN SATURATION: 98 % | SYSTOLIC BLOOD PRESSURE: 136 MMHG | DIASTOLIC BLOOD PRESSURE: 60 MMHG

## 2024-07-15 PROCEDURE — G0151 HHCP-SERV OF PT,EA 15 MIN: HCPCS

## 2024-07-15 NOTE — CASE COMMUNICATION
Patient's BP was elevated at rest at beginning of session at 172/70 but came down by end of session to 136/60 at rest. Patient reports she has taken her meds and had a cup of tea prior to PT session.

## 2024-07-26 DIAGNOSIS — R73.01 IFG (IMPAIRED FASTING GLUCOSE): ICD-10-CM

## 2024-07-26 RX ORDER — METFORMIN HYDROCHLORIDE 500 MG/1
500 TABLET, EXTENDED RELEASE ORAL 2 TIMES DAILY
Qty: 180 TABLET | Refills: 1 | Status: SHIPPED | OUTPATIENT
Start: 2024-07-26

## 2024-07-29 ENCOUNTER — HOME CARE VISIT (OUTPATIENT)
Dept: HOME HEALTH SERVICES | Facility: HOME HEALTHCARE | Age: 79
End: 2024-07-29
Payer: MEDICARE

## 2024-07-29 VITALS — HEART RATE: 88 BPM | OXYGEN SATURATION: 97 % | SYSTOLIC BLOOD PRESSURE: 160 MMHG | DIASTOLIC BLOOD PRESSURE: 70 MMHG

## 2024-07-29 PROCEDURE — G0151 HHCP-SERV OF PT,EA 15 MIN: HCPCS

## 2024-08-02 DIAGNOSIS — E78.00 HYPERCHOLESTEROLEMIA: ICD-10-CM

## 2024-08-02 RX ORDER — ATORVASTATIN CALCIUM 20 MG/1
20 TABLET, FILM COATED ORAL DAILY
Qty: 90 TABLET | Refills: 1 | Status: SHIPPED | OUTPATIENT
Start: 2024-08-02

## 2024-10-04 NOTE — PROGRESS NOTES
[facility]  [address], [address]  [place] [address]  [phone]    Care Conference    NAME: Chelsea Bowles  AGE: 78 y.o. SEX: female  YOB: 1945  DATE OF ASSESSMENT: 06/10/24  DATE OF CONFERENCE: 10/14/24    Family Present: Daughters (Blanca, in person and Naye, via phone)  Staff Present: Jose Huber MD, CAMILA Michelle and Dr. Chepe Lal (neurology resident)    Patient / Family Goals of Care: Review cognitive decline and associated symptoms, discuss treatment options and care planning.     Medical Concerns (Current/Historical): Depression, ambulatory dysfunction, diabetes mellitus, type 2, hypertension, hyperlipidemia, hypothyroidism    Geriatric Syndromes/Age Related Syndromes: Dementia, likely Alzheimer's type, Mild    Neuropsychological  Dementia, likely Alzheimer's type, Mild  Félix Cognitive Assessment: 12/30  Geriatric Depression Screen: 5/15    Decision-making capacity: likely limited for complex medical or financial decisions. Could consider neuropsychology assessment for capacity if needed.   Staging: FAST stage IV  Driving safety: Patient does not drive, continue with driving cessation   ACP review: POA copy provided  Caregiver review: Daughter is the main caregiver. She met with social service and community resources information provided.     Remain active physically, mentally and socially  Pharmaceutical and non-pharmaceutical interventions discussed. Risks and benefits of medications discussed at length. Family agreed on non-pharmacologic management and not to start medications at this time.  Engage in cognitively challenging exercises such as crosswords and puzzles  Maintain chronic conditions under control  OT and speech therapy referral  Repeat cognitive assessment in 6 months    Diagnostic Studies  Blood work from 4/17/2024 reviewed including TSH 5.05, T4 0.73  Lipid profile: Total cholesterol 130, HDL 66, LDL 49  A1c  6.5  CMP: Na 139, K 3.9, BUN 15, creatinine 1.85, GFR 65  Workup discussed with daughter and agreed not to proceed with any imaging studies    Physical Finding Impacting Function   Timed Up and Go Test: 15 seconds   Fall Risk: high   Activities of Daily Living: assist   Instrumental Activities of Daily Living: assist    Encourage appropriate footwear at all times  Review fall risk prevention tips and adjust within the home environment as needed    Medications Reviewed   Medications seem appropriate for present conditions  Check with PCP before using over the counter medications  Avoid over the counter medications that can affect cognition (e.g., Benadryl, Tylenol PM)   Avoid NSAIDs due to risk of GI bleed and renal impairment    Other Findings   Overall health  BMI: 29.29 kg/m2  Maintain well-balanced diet  Continue following with primary care physician regularly  Ambulatory dysfunction  TUG 15 seconds  High fall risk  Physical therapy discussed and patient agreed, referred to PT at home  Depression  GDS 5/15  Social isolation, home health information provided  Will start Zoloft 25 mg daily   Diabetes mellitus type 2  Last hemoglobin 6.5  On metformin   Continue to follow with PCP to address  Hypertension  History of HTN  On Amlodipine and Losartan for BP optimization  Continue to address with PCP  Hyperlipidemia  Continue home Atorvastatin  LDL 49 (4/17/2024)  Hypothyroidism  Last TSH 5.059, T4 0.73  Will defer to PCP     Recommended Health Maintenance   Immunizations, if not contraindicated:   Influenza vaccine yearly  Pneumo vaccine every 5 years (65 years and over)  Shingles vaccine  COVID-19 vaccine    Social / Safety Concerns  Consider an Adult Day Program for positive socialization, physical exercise, cognitive stimulation and family respite  Consider a home care aide to assist with daily care needs  Stay in touch with family and friends  Plan self-care activities for your mental well-being each  week  Recommend review of fall risk prevention tips  Recommend use of fall precautions including fall alert device  Consider assistance for medication administration such as blister packaging or use of an automated pill dispenser  Recommend contacting your local Counts include 234 beds at the [facility] Agency on Aging for possible eligible programs such as OPTIONS, Caregiver Support Program, or WAIVER    Long Term Care Issues  Maintain updated advance directives and provide a copy to your primary care provider  Consider caregiver support groups and educational resources through the Alzheimer's Association; access Alzheimer's Association 24/7 Helpline at 1-926.118.5063  [facility] does offer a monthly caregiver support group. If interested, please speak with a .  Utilize reorientation and redirection as needed (dependent on situation)  Educational information provided  Recommended literature: 36 Hour Day, Untangling Alzheimer's, Learning to Speak Alzheimer's    Patient and family verbalized understanding of above care plan.    For care coordination purposes, this care plan will be shared with your primary care provider. With any questions, please contact our office at [phone].       Chief Complaint     Chief Complaint   Patient presents with    Care Conference      Patient is here today for family conference to discuss results and recommendations for memory loss.    History of Present Illness     Chelsea Bowles is a 78 y.o. y.o. female presents for family conference to review memory loss and associated symptoms, discuss treatment options and care planning.Patient's daughters were also in attendance. Initial comprehensive geriatric assessment completed on 6/10/2024, please refer to initial consultation for full details and history. MoCA 12/30 at that time.   Patient reports feeling well and denies any major events since last visit. Patient denies any recent falls, hospitalization or any other acute  complaints.    The following portions of the patient's history were reviewed and updated as appropriate: allergies, current medications, past family history, past medical history, past social history, past surgical history and problem list.     Review of Systems     Constitutional: Negative for activity change.   HENT: Negative for hearing loss, trouble swallowing.    Eyes: Negative for visual disturbance.   Respiratory: Negative for choking and shortness of breath.    Gastrointestinal: Negative for nausea.   Genitourinary: Negative for dysuria and frequency.   Musculoskeletal: Negative for back pain and neck pain.   Neurological: Negative for dizziness, facial asymmetry, speech difficulty, weakness and light-headedness.   Psychiatric/Behavioral: Negative for behavioral problems and confusion.      Objective     /66 (BP Location: Left arm, Patient Position: Sitting, Cuff Size: Standard)   Pulse 72   Temp 97.7 °F (36.5 °C) (Temporal)   Ht 5' (1.524 m)   Wt 67.1 kg (148 lb)   SpO2 96%   BMI 28.90 kg/m²     Physical Exam  Vitals reviewed.   HENT:   Head: Normocephalic.   Nose: No congestion.   Mouth/Throat:   Mouth: Mucous membranes are moist.   Eyes:   General:   Right eye: No discharge.   Left eye: No discharge.   Cardiovascular:   Rate and Rhythm: Normal rate and regular rhythm.   Pulses: Normal pulses.   Heart sounds: Normal heart sounds.   Pulmonary:   Effort: Pulmonary effort is normal.   Breath sounds: Normal breath sounds.   Abdominal:   General: Bowel sounds are normal.   Palpations: Abdomen is soft.   Musculoskeletal:   General: Normal range of motion.   Cervical back: Normal range of motion.   Skin:  General: Skin is warm and dry.   Neurological:   Mental Status: Patient is alert. Mental status is at baseline.   Psychiatric:   Mood and Affect: Mood normal.

## 2024-10-05 DIAGNOSIS — I10 BENIGN ESSENTIAL HTN: ICD-10-CM

## 2024-10-05 RX ORDER — AMLODIPINE BESYLATE 5 MG/1
TABLET ORAL
Qty: 90 TABLET | Refills: 1 | Status: SHIPPED | OUTPATIENT
Start: 2024-10-05

## 2024-10-14 ENCOUNTER — OFFICE VISIT (OUTPATIENT)
Age: 79
End: 2024-10-14
Payer: MEDICARE

## 2024-10-14 VITALS
HEIGHT: 60 IN | SYSTOLIC BLOOD PRESSURE: 154 MMHG | TEMPERATURE: 97.7 F | HEART RATE: 72 BPM | WEIGHT: 148 LBS | BODY MASS INDEX: 29.06 KG/M2 | OXYGEN SATURATION: 96 % | DIASTOLIC BLOOD PRESSURE: 66 MMHG

## 2024-10-14 DIAGNOSIS — R26.2 AMBULATORY DYSFUNCTION: ICD-10-CM

## 2024-10-14 DIAGNOSIS — Z71.89 ADVANCED CARE PLANNING/COUNSELING DISCUSSION: ICD-10-CM

## 2024-10-14 DIAGNOSIS — N18.31 STAGE 3A CHRONIC KIDNEY DISEASE (HCC): ICD-10-CM

## 2024-10-14 DIAGNOSIS — E11.22 TYPE 2 DIABETES MELLITUS WITH STAGE 3A CHRONIC KIDNEY DISEASE, WITHOUT LONG-TERM CURRENT USE OF INSULIN (HCC): ICD-10-CM

## 2024-10-14 DIAGNOSIS — I10 BENIGN ESSENTIAL HTN: ICD-10-CM

## 2024-10-14 DIAGNOSIS — F02.A0 MILD LATE ONSET ALZHEIMER'S DEMENTIA WITHOUT BEHAVIORAL DISTURBANCE, PSYCHOTIC DISTURBANCE, MOOD DISTURBANCE, OR ANXIETY (HCC): Primary | ICD-10-CM

## 2024-10-14 DIAGNOSIS — E78.00 HYPERCHOLESTEROLEMIA: ICD-10-CM

## 2024-10-14 DIAGNOSIS — N18.31 TYPE 2 DIABETES MELLITUS WITH STAGE 3A CHRONIC KIDNEY DISEASE, WITHOUT LONG-TERM CURRENT USE OF INSULIN (HCC): ICD-10-CM

## 2024-10-14 DIAGNOSIS — F33.9 DEPRESSION, RECURRENT (HCC): ICD-10-CM

## 2024-10-14 DIAGNOSIS — G30.1 MILD LATE ONSET ALZHEIMER'S DEMENTIA WITHOUT BEHAVIORAL DISTURBANCE, PSYCHOTIC DISTURBANCE, MOOD DISTURBANCE, OR ANXIETY (HCC): Primary | ICD-10-CM

## 2024-10-14 DIAGNOSIS — E03.9 ACQUIRED HYPOTHYROIDISM: ICD-10-CM

## 2024-10-14 PROCEDURE — 99483 ASSMT & CARE PLN PT COG IMP: CPT | Performed by: INTERNAL MEDICINE

## 2024-10-14 NOTE — PROGRESS NOTES
"[facility]  [address], [address]  [place] [address] [phone]    Care Conference: Resources Provided    LSW participated in today's care conference and provided the following resources, along with a copy of care plan:  [address]  [place] [address]    General Information  - 10 Ways to Love Your Brain  - Memory loss ladder  - Packet with Alzheimer's Association information including: definition of dementia, communication tips for different stages, common behaviors and response strategies  - NIH KAUR \"Now What? Next Steps After an Alzheimer's Diagnosis\"    Caregiver Support  - Flyer for [facility] Virtual Caregiver Support Group (meeting 2x per month by Zoom)  - Alzheimer's Association Rx Pad (guide to website and 24/7 helpline, 1-762.436.2641)    Safety  - CDC fall prevention / home safety checklist    Community Resources  - [facility]ging in Place Resource Guide (contains information about higher levels of care, homecare, etc.)  - [employer] [facility] the [facility]: What is Dementia & Where to Begin brochure & Resource Guide  - Fall detection/wandering devices list  - Apple watch fall detection information  - Senior center list   - Adult day center list    Resources provided at initial visit:  -[employer] fall detection/GPS tracking systems  - [employer] memory phone  - Adult day center list  - Homecare/Waiver/AAA List    Other  - [facility] Care: Caregiver Website QR code to scan for resources/education  "

## 2024-10-22 ENCOUNTER — APPOINTMENT (OUTPATIENT)
Dept: LAB | Age: 79
End: 2024-10-22
Payer: MEDICARE

## 2024-10-22 DIAGNOSIS — N18.31 STAGE 3A CHRONIC KIDNEY DISEASE (HCC): ICD-10-CM

## 2024-10-22 DIAGNOSIS — E11.22 TYPE 2 DIABETES MELLITUS WITH STAGE 3A CHRONIC KIDNEY DISEASE, WITHOUT LONG-TERM CURRENT USE OF INSULIN (HCC): ICD-10-CM

## 2024-10-22 DIAGNOSIS — E78.00 HYPERCHOLESTEROLEMIA: ICD-10-CM

## 2024-10-22 DIAGNOSIS — R41.3 MEMORY LOSS: ICD-10-CM

## 2024-10-22 DIAGNOSIS — E03.9 ACQUIRED HYPOTHYROIDISM: ICD-10-CM

## 2024-10-22 DIAGNOSIS — R25.1 TREMOR: ICD-10-CM

## 2024-10-22 DIAGNOSIS — I10 BENIGN ESSENTIAL HTN: ICD-10-CM

## 2024-10-22 DIAGNOSIS — R26.9 GAIT ABNORMALITY: ICD-10-CM

## 2024-10-22 DIAGNOSIS — N18.31 TYPE 2 DIABETES MELLITUS WITH STAGE 3A CHRONIC KIDNEY DISEASE, WITHOUT LONG-TERM CURRENT USE OF INSULIN (HCC): ICD-10-CM

## 2024-10-22 LAB
ALBUMIN SERPL BCG-MCNC: 4.2 G/DL (ref 3.5–5)
ALP SERPL-CCNC: 53 U/L (ref 34–104)
ALT SERPL W P-5'-P-CCNC: 22 U/L (ref 7–52)
ANION GAP SERPL CALCULATED.3IONS-SCNC: 9 MMOL/L (ref 4–13)
AST SERPL W P-5'-P-CCNC: 21 U/L (ref 5–45)
BILIRUB SERPL-MCNC: 0.52 MG/DL (ref 0.2–1)
BUN SERPL-MCNC: 16 MG/DL (ref 5–25)
CALCIUM SERPL-MCNC: 9.5 MG/DL (ref 8.4–10.2)
CHLORIDE SERPL-SCNC: 101 MMOL/L (ref 96–108)
CHOLEST SERPL-MCNC: 133 MG/DL
CO2 SERPL-SCNC: 29 MMOL/L (ref 21–32)
CREAT SERPL-MCNC: 0.83 MG/DL (ref 0.6–1.3)
CREAT UR-MCNC: 57.8 MG/DL
EST. AVERAGE GLUCOSE BLD GHB EST-MCNC: 131 MG/DL
FERRITIN SERPL-MCNC: 142 NG/ML (ref 24–307)
FOLATE SERPL-MCNC: >22.3 NG/ML
GLUCOSE P FAST SERPL-MCNC: 129 MG/DL (ref 65–99)
HBA1C MFR BLD: 6.2 %
HDLC SERPL-MCNC: 78 MG/DL
IRON SATN MFR SERPL: 29 % (ref 15–50)
IRON SERPL-MCNC: 91 UG/DL (ref 50–212)
LDLC SERPL CALC-MCNC: 39 MG/DL (ref 0–100)
MICROALBUMIN UR-MCNC: 11.6 MG/L
MICROALBUMIN/CREAT 24H UR: 20 MG/G CREATININE (ref 0–30)
NONHDLC SERPL-MCNC: 55 MG/DL
POTASSIUM SERPL-SCNC: 3.9 MMOL/L (ref 3.5–5.3)
PROT SERPL-MCNC: 7.5 G/DL (ref 6.4–8.4)
SODIUM SERPL-SCNC: 139 MMOL/L (ref 135–147)
TIBC SERPL-MCNC: 313 UG/DL (ref 250–450)
TREPONEMA PALLIDUM IGG+IGM AB [PRESENCE] IN SERUM OR PLASMA BY IMMUNOASSAY: NORMAL
TRIGL SERPL-MCNC: 78 MG/DL
TSH SERPL DL<=0.05 MIU/L-ACNC: 4.13 UIU/ML (ref 0.45–4.5)
UIBC SERPL-MCNC: 222 UG/DL (ref 155–355)
VIT B12 SERPL-MCNC: 462 PG/ML (ref 180–914)

## 2024-10-22 PROCEDURE — 80053 COMPREHEN METABOLIC PANEL: CPT

## 2024-10-22 PROCEDURE — 83036 HEMOGLOBIN GLYCOSYLATED A1C: CPT

## 2024-10-22 PROCEDURE — 82570 ASSAY OF URINE CREATININE: CPT

## 2024-10-22 PROCEDURE — 82043 UR ALBUMIN QUANTITATIVE: CPT

## 2024-10-22 PROCEDURE — 82746 ASSAY OF FOLIC ACID SERUM: CPT

## 2024-10-22 PROCEDURE — 86780 TREPONEMA PALLIDUM: CPT

## 2024-10-22 PROCEDURE — 83540 ASSAY OF IRON: CPT

## 2024-10-22 PROCEDURE — 84443 ASSAY THYROID STIM HORMONE: CPT

## 2024-10-22 PROCEDURE — 80061 LIPID PANEL: CPT

## 2024-10-22 PROCEDURE — 83550 IRON BINDING TEST: CPT

## 2024-10-22 PROCEDURE — 36415 COLL VENOUS BLD VENIPUNCTURE: CPT

## 2024-10-22 PROCEDURE — 82728 ASSAY OF FERRITIN: CPT

## 2024-11-07 ENCOUNTER — RA CDI HCC (OUTPATIENT)
Dept: OTHER | Facility: HOSPITAL | Age: 79
End: 2024-11-07

## 2024-11-13 PROBLEM — N18.2 STAGE 2 CHRONIC KIDNEY DISEASE: Status: ACTIVE | Noted: 2022-03-22

## 2024-11-14 ENCOUNTER — TELEPHONE (OUTPATIENT)
Dept: FAMILY MEDICINE CLINIC | Facility: CLINIC | Age: 79
End: 2024-11-14

## 2024-11-14 ENCOUNTER — OFFICE VISIT (OUTPATIENT)
Dept: FAMILY MEDICINE CLINIC | Facility: CLINIC | Age: 79
End: 2024-11-14
Payer: MEDICARE

## 2024-11-14 VITALS
HEART RATE: 75 BPM | WEIGHT: 146 LBS | SYSTOLIC BLOOD PRESSURE: 136 MMHG | HEIGHT: 60 IN | BODY MASS INDEX: 28.66 KG/M2 | TEMPERATURE: 96.8 F | RESPIRATION RATE: 16 BRPM | DIASTOLIC BLOOD PRESSURE: 50 MMHG | OXYGEN SATURATION: 98 %

## 2024-11-14 DIAGNOSIS — G30.1 MILD LATE ONSET ALZHEIMER'S DEMENTIA WITHOUT BEHAVIORAL DISTURBANCE, PSYCHOTIC DISTURBANCE, MOOD DISTURBANCE, OR ANXIETY (HCC): ICD-10-CM

## 2024-11-14 DIAGNOSIS — N18.2 STAGE 2 CHRONIC KIDNEY DISEASE: ICD-10-CM

## 2024-11-14 DIAGNOSIS — I10 BENIGN ESSENTIAL HTN: ICD-10-CM

## 2024-11-14 DIAGNOSIS — E11.22 TYPE 2 DIABETES MELLITUS WITH STAGE 3A CHRONIC KIDNEY DISEASE, WITHOUT LONG-TERM CURRENT USE OF INSULIN (HCC): ICD-10-CM

## 2024-11-14 DIAGNOSIS — F33.9 DEPRESSION, RECURRENT (HCC): ICD-10-CM

## 2024-11-14 DIAGNOSIS — N18.31 TYPE 2 DIABETES MELLITUS WITH STAGE 3A CHRONIC KIDNEY DISEASE, WITHOUT LONG-TERM CURRENT USE OF INSULIN (HCC): ICD-10-CM

## 2024-11-14 DIAGNOSIS — F02.A0 MILD LATE ONSET ALZHEIMER'S DEMENTIA WITHOUT BEHAVIORAL DISTURBANCE, PSYCHOTIC DISTURBANCE, MOOD DISTURBANCE, OR ANXIETY (HCC): ICD-10-CM

## 2024-11-14 DIAGNOSIS — E78.00 HYPERCHOLESTEROLEMIA: ICD-10-CM

## 2024-11-14 DIAGNOSIS — Z71.89 COUNSELING REGARDING ADVANCED DIRECTIVES: ICD-10-CM

## 2024-11-14 DIAGNOSIS — Z00.00 ENCOUNTER FOR MEDICARE ANNUAL WELLNESS EXAM: Primary | ICD-10-CM

## 2024-11-14 DIAGNOSIS — E03.9 ACQUIRED HYPOTHYROIDISM: ICD-10-CM

## 2024-11-14 PROCEDURE — G0439 PPPS, SUBSEQ VISIT: HCPCS | Performed by: FAMILY MEDICINE

## 2024-11-14 PROCEDURE — 99214 OFFICE O/P EST MOD 30 MIN: CPT | Performed by: FAMILY MEDICINE

## 2024-11-14 RX ORDER — ATORVASTATIN CALCIUM 10 MG/1
10 TABLET, FILM COATED ORAL DAILY
Qty: 90 TABLET | Refills: 2 | Status: SHIPPED | OUTPATIENT
Start: 2024-11-14

## 2024-11-14 NOTE — TELEPHONE ENCOUNTER
In your notes it says to do labs before next visit in 6 months. Orders were not placed. Can you please do so and send this message back to me? Daughter wants me to call her when completed.      [phone]

## 2024-11-14 NOTE — ASSESSMENT & PLAN NOTE
Patient was started on Zoloft 25 mg by geriatrics.  She has had more loss of interest in things, less socialization, appears down today.  I will check with Dr. Huber to see if he agrees with increasing the dose to 50 mg.

## 2024-11-14 NOTE — ASSESSMENT & PLAN NOTE
Well-controlled on metformin twice a day.  Patient has been losing interest in food and is slowly losing weight.  Lab Results   Component Value Date    HGBA1C 6.2 (H) 10/22/2024       Orders:    Comprehensive metabolic panel; Future    Lipid panel; Future    Hemoglobin A1C; Future    TSH, 3rd generation; Future

## 2024-11-14 NOTE — ASSESSMENT & PLAN NOTE
Confirmed wishes today with the patient and her daughter.  She confirms she is a DNR and her 2 daughters would be her medical power of .

## 2024-11-14 NOTE — ASSESSMENT & PLAN NOTE
Orders:    Comprehensive metabolic panel; Future    Lipid panel; Future    Hemoglobin A1C; Future    TSH, 3rd generation; Future

## 2024-11-14 NOTE — ASSESSMENT & PLAN NOTE
Very well-controlled.  Suspect she does not need the 20 mg of Lipitor.  Will lower dose to 10 mg and repeat blood work prior to follow-up.  Orders:    atorvastatin (LIPITOR) 10 mg tablet; Take 1 tablet (10 mg total) by mouth daily    Comprehensive metabolic panel; Future    Lipid panel; Future    Hemoglobin A1C; Future    TSH, 3rd generation; Future

## 2024-11-14 NOTE — PATIENT INSTRUCTIONS
[facility]ist Monthly (auto-renewal) (amazon.com)     [facility]ist  Frequently Asked Questions    Overview & access  What is [facility]ist?  [facility]ist is a subscription service that provides an extra level of protection to help keep your family safe. With [facility]ist you can get help in an emergency, keep your Emergency Contacts informed, and receive Smart Alerts for alarms in your home whether you’re home or away. Emergency Assist provides fast, hands-free access to an Urgent Response agent 24/7, 365 days a year, and works with all Echo smart speakers and smart displays. Just say, “Britany, call for help” to speak with trained agents who can request the dispatch of emergency responders - such as police, the fire department, or an ambulance - based on information you provide on the call. Emergency Assist includes smoke and carbon monoxide alarm sound detection. If an alarm is triggered Britany will announce on all your devices that an alarm has been detected and instruct you to leave the home and call for help. You will also receive a Smart Alert on your mobile phone where you can listen to a sound snippet of the alarm, or Drop In on the device if you’re away from home. The Smart Alert also includes a button that enables you to call Urgent Response from your mobile phone.  Who answers when I call Urgent Response?  Urgent Response calls are answered by [facility] With over 30 years of experience, [facility] utilizes the latest technology to help keep homes and businesses safe. Their agents undergo extensive response training to better help protect you and your family.  What sounds can Britany detect?  [facility]ist can detect and notify you about the sound of smoke alarms and carbon monoxide alarms, and the sound of glass breaking.  What happens when Britany detects a selected sound?  If a smoke or carbon monoxide alarm is activated  Britany will send a Smart Alert to your phone that includes an audio recording of the detected event so you can take action if you’re away from home. Tapping the notification opens the Britany gwendolyn where you can play back what Britany heard or Drop In for live audio. If you need emergency help, you can tap the icon to call Urgent Response.  How do I enable Britany to detect selected sounds?  Smart Alerts are disabled by default. To enable Britany to detect smoke and carbon monoxide alarms and the sound of glass breaking, open the Britany gwendolyn and tap “[facility]n Emergency Assist” to access the dashboard. Tap the settings icon, then “Smart Alerts”. Toggle “Smoke & CO alarm sounds” and/or “Glass breaking sounds” on.  Is [facility]ist a professional monitoring service?  No, Emergency Assist is not a professional monitoring service. If an alarm or breaking glass is detected Britany will send a Smart Alert to your phone. Smart Alerts are provided for informational purposes only and if you get an alert, you should listen to it and make an independent judgment whether to contact Urgent Response.       Getting Started  How do I subscribe/signup to [facility]?  Subscribe on the [facility]ist page or by saying, “Britany, buy [facility] Emergency Assist” from your Echo device and provide the information required. When you have completed check-out, download the latest version of the Britany gwendolyn (if not already installed) and follow the on-screen instructions. [facility]ist is only available to customers in the [facility] states and requires at least one supported Echo device. You can manage your subscription anytime through your Amazon account under Your Memberships & Subscriptions, or by contacting Customer Service.  Which Echo devices work with [facility]ist?  Emergency Assist works with Echo devices including all generations and configurations of Echo, Echo Dot, Echo Plus, Echo Flex, Echo Studio, Echo Spot, Echo  Show, and Echo Hub. You can also call Urgent Response from select, third-party Britany-enabled devices that support [employer]. [facility] Assist is not supported on Britany-on-the-go devices including Echo Auto and Echo Buds.  How do I setup [facility]ist?  Before you begin, make sure you have the latest version of the Britany gwendolyn on your phone, and that your Echo device software is up to date. Just ask, “Britany, is your software up to date?“. To setup Emergency Assist, open the Britany gwendolyn, tap More and select [facility]ist. From the dashboard, tap the settings cog, then “Your Information” to review your profile information and verify that your emergency address and phone number are correct. From the “Your Information” page, tap “Manage” to add Critical Information such as any medical conditions, gate codes, etc. (Optional) that will be useful to first responders. To setup Emergency Contacts, return to the dashboard and tap “Add/Remove” to add people that you want to be notified in case of an emergency. If you would like to enable Smart Alerts, tap the settings cog, then “Smart Alerts” to select the types of alerts you'd like to receive.  Tip: To ensure that Britany is within voice range in an emergency, we recommend having at least one Echo device in each room or space in your home. To ensure that Britany detects selected sounds in your home, place your Echo devices close to the smoke or carbon monoxide detector that you want to monitor.    See more frequently asked questions    Medicare Preventive Visit Patient Instructions  Thank you for completing your Welcome to Medicare Visit or Medicare Annual Wellness Visit today. Your next wellness visit will be due in one year (11/15/2025).  The screening/preventive services that you may require over the next 5-10 years are detailed below. Some tests may not apply to you based off risk factors and/or age. Screening tests ordered at today's visit but  not completed yet may show as past due. Also, please note that scanned in results may not display below.  Preventive Screenings:  Service Recommendations Previous Testing/Comments   Colorectal Cancer Screening  * Colonoscopy    * Fecal Occult Blood Test (FOBT)/Fecal Immunochemical Test (FIT)  * Fecal DNA/Cologuard Test  * Flexible Sigmoidoscopy Age: 45-75 years old   Colonoscopy: every 10 years (may be performed more frequently if at higher risk)  OR  FOBT/FIT: every 1 year  OR  Cologuard: every 3 years  OR  Sigmoidoscopy: every 5 years  Screening may be recommended earlier than age 45 if at higher risk for colorectal cancer. Also, an individualized decision between you and your healthcare provider will decide whether screening between the ages of 76-85 would be appropriate. Colonoscopy: Not on file  FOBT/FIT: Not on file  Cologuard: Not on file  Sigmoidoscopy: Not on file    Screening Not Indicated     Breast Cancer Screening Age: 40+ years old  Frequency: every 1-2 years  Not required if history of left and right mastectomy Mammogram: 09/14/2021    Screening Not Indicated   Cervical Cancer Screening Between the ages of 21-29, pap smear recommended once every 3 years.   Between the ages of 30-65, can perform pap smear with HPV co-testing every 5 years.   Recommendations may differ for women with a history of total hysterectomy, cervical cancer, or abnormal pap smears in past. Pap Smear: Not on file    Screening Not Indicated   Hepatitis C Screening Once for adults born between 1945 and 1965  More frequently in patients at high risk for Hepatitis C Hep C Antibody: 03/08/2022    Screening Current   Diabetes Screening 1-2 times per year if you're at risk for diabetes or have pre-diabetes Fasting glucose: 129 mg/dL (10/22/2024)  A1C: 6.2 % (10/22/2024)  Screening Not Indicated  History Diabetes   Cholesterol Screening Once every 5 years if you don't have a lipid disorder. May order more often based on risk factors.  Lipid panel: 10/22/2024    Screening Not Indicated  History Lipid Disorder     Other Preventive Screenings Covered by Medicare:  Abdominal Aortic Aneurysm (AAA) Screening: covered once if your at risk. You're considered to be at risk if you have a family history of AAA.  Lung Cancer Screening: covers low dose CT scan once per year if you meet all of the following conditions: (1) Age 55-77; (2) No signs or symptoms of lung cancer; (3) Current smoker or have quit smoking within the last 15 years; (4) You have a tobacco smoking history of at least 20 pack years (packs per day multiplied by number of years you smoked); (5) You get a written order from a healthcare provider.  Glaucoma Screening: covered annually if you're considered high risk: (1) You have diabetes OR (2) Family history of glaucoma OR (3)  aged 50 and older OR (4)  American aged 65 and older  Osteoporosis Screening: covered every 2 years if you meet one of the following conditions: (1) You're estrogen deficient and at risk for osteoporosis based off medical history and other findings; (2) Have a vertebral abnormality; (3) On glucocorticoid therapy for more than 3 months; (4) Have primary hyperparathyroidism; (5) On osteoporosis medications and need to assess response to drug therapy.   Last bone density test (DXA Scan): 04/03/2024.  HIV Screening: covered annually if you're between the age of 15-65. Also covered annually if you are younger than 15 and older than 65 with risk factors for HIV infection. For pregnant patients, it is covered up to 3 times per pregnancy.    Immunizations:  Immunization Recommendations   Influenza Vaccine Annual influenza vaccination during flu season is recommended for all persons aged >= 6 months who do not have contraindications   Pneumococcal Vaccine   * Pneumococcal conjugate vaccine = PCV13 (Prevnar 13), PCV15 (Vaxneuvance), PCV20 (Prevnar 20)  * Pneumococcal polysaccharide vaccine = PPSV23  (Pneumovax) Adults 19-63 yo with certain risk factors or if 65+ yo  If never received any pneumonia vaccine: recommend Prevnar 20 (PCV20)  Give PCV20 if previously received 1 dose of PCV13 or PPSV23   Hepatitis B Vaccine 3 dose series if at intermediate or high risk (ex: diabetes, end stage renal disease, liver disease)   Respiratory syncytial virus (RSV) Vaccine - COVERED BY MEDICARE PART D  * RSVPreF3 (Arexvy) CDC recommends that adults 60 years of age and older may receive a single dose of RSV vaccine using shared clinical decision-making (SCDM)   Tetanus (Td) Vaccine - COST NOT COVERED BY MEDICARE PART B Following completion of primary series, a booster dose should be given every 10 years to maintain immunity against tetanus. Td may also be given as tetanus wound prophylaxis.   Tdap Vaccine - COST NOT COVERED BY MEDICARE PART B Recommended at least once for all adults. For pregnant patients, recommended with each pregnancy.   Shingles Vaccine (Shingrix) - COST NOT COVERED BY MEDICARE PART B  2 shot series recommended in those 19 years and older who have or will have weakened immune systems or those 50 years and older     Health Maintenance Due:      Topic Date Due   • DXA SCAN  04/03/2026   • Hepatitis C Screening  Completed     Immunizations Due:  There are no preventive care reminders to display for this patient.  Advance Directives   What are advance directives?  Advance directives are legal documents that state your wishes and plans for medical care. These plans are made ahead of time in case you lose your ability to make decisions for yourself. Advance directives can apply to any medical decision, such as the treatments you want, and if you want to donate organs.   What are the types of advance directives?  There are many types of advance directives, and each state has rules about how to use them. You may choose a combination of any of the following:  Living will:  This is a written record of the treatment  you want. You can also choose which treatments you do not want, which to limit, and which to stop at a certain time. This includes surgery, medicine, IV fluid, and tube feedings.   Durable power of  for healthcare (DPAHC):  This is a written record that states who you want to make healthcare choices for you when you are unable to make them for yourself. This person, called a proxy, is usually a family member or a friend. You may choose more than 1 proxy.  Do not resuscitate (DNR) order:  A DNR order is used in case your heart stops beating or you stop breathing. It is a request not to have certain forms of treatment, such as CPR. A DNR order may be included in other types of advance directives.  Medical directive:  This covers the care that you want if you are in a coma, near death, or unable to make decisions for yourself. You can list the treatments you want for each condition. Treatment may include pain medicine, surgery, blood transfusions, dialysis, IV or tube feedings, and a ventilator (breathing machine).  Values history:  This document has questions about your views, beliefs, and how you feel and think about life. This information can help others choose the care that you would choose.  Why are advance directives important?  An advance directive helps you control your care. Although spoken wishes may be used, it is better to have your wishes written down. Spoken wishes can be misunderstood, or not followed. Treatments may be given even if you do not want them. An advance directive may make it easier for your family to make difficult choices about your care.   Weight Management   Why it is important to manage your weight:  Being overweight increases your risk of health conditions such as heart disease, high blood pressure, type 2 diabetes, and certain types of cancer. It can also increase your risk for osteoarthritis, sleep apnea, and other respiratory problems. Aim for a slow, steady weight loss. Even  a small amount of weight loss can lower your risk of health problems.  How to lose weight safely:  A safe and healthy way to lose weight is to eat fewer calories and get regular exercise. You can lose up about 1 pound a week by decreasing the number of calories you eat by 500 calories each day.   Healthy meal plan for weight management:  A healthy meal plan includes a variety of foods, contains fewer calories, and helps you stay healthy. A healthy meal plan includes the following:  Eat whole-grain foods more often.  A healthy meal plan should contain fiber. Fiber is the part of grains, fruits, and vegetables that is not broken down by your body. Whole-grain foods are healthy and provide extra fiber in your diet. Some examples of whole-grain foods are whole-wheat breads and pastas, oatmeal, brown rice, and bulgur.  Eat a variety of vegetables every day.  Include dark, leafy greens such as spinach, kale, vickie greens, and mustard greens. Eat yellow and orange vegetables such as carrots, sweet potatoes, and winter squash.   Eat a variety of fruits every day.  Choose fresh or canned fruit (canned in its own juice or light syrup) instead of juice. Fruit juice has very little or no fiber.  Eat low-fat dairy foods.  Drink fat-free (skim) milk or 1% milk. Eat fat-free yogurt and low-fat cottage cheese. Try low-fat cheeses such as mozzarella and other reduced-fat cheeses.  Choose meat and other protein foods that are low in fat.  Choose beans or other legumes such as split peas or lentils. Choose fish, skinless poultry (chicken or turkey), or lean cuts of red meat (beef or pork). Before you cook meat or poultry, cut off any visible fat.   Use less fat and oil.  Try baking foods instead of frying them. Add less fat, such as margarine, sour cream, regular salad dressing and mayonnaise to foods. Eat fewer high-fat foods. Some examples of high-fat foods include french fries, doughnuts, ice cream, and cakes.  Eat fewer sweets.   Limit foods and drinks that are high in sugar. This includes candy, cookies, regular soda, and sweetened drinks.  Exercise:  Exercise at least 30 minutes per day on most days of the week. Some examples of exercise include walking, biking, dancing, and swimming. You can also fit in more physical activity by taking the stairs instead of the elevator or parking farther away from stores. Ask your healthcare provider about the best exercise plan for you.      © Copyright [employer] 2018 Information is for End User's use only and may not be sold, redistributed or otherwise used for commercial purposes. All illustrations and images included in CareNotes® are the copyrighted property of [employer]. or [employer]      Medicare Preventive Visit Patient Instructions  Thank you for completing your Welcome to Medicare Visit or Medicare Annual Wellness Visit today. Your next wellness visit will be due in one year (11/15/2025).  The screening/preventive services that you may require over the next 5-10 years are detailed below. Some tests may not apply to you based off risk factors and/or age. Screening tests ordered at today's visit but not completed yet may show as past due. Also, please note that scanned in results may not display below.  Preventive Screenings:  Service Recommendations Previous Testing/Comments   Colorectal Cancer Screening  * Colonoscopy    * Fecal Occult Blood Test (FOBT)/Fecal Immunochemical Test (FIT)  * Fecal DNA/Cologuard Test  * Flexible Sigmoidoscopy Age: 45-75 years old   Colonoscopy: every 10 years (may be performed more frequently if at higher risk)  OR  FOBT/FIT: every 1 year  OR  Cologuard: every 3 years  OR  Sigmoidoscopy: every 5 years  Screening may be recommended earlier than age 45 if at higher risk for colorectal cancer. Also, an individualized decision between you and your healthcare provider will decide whether screening between the ages of 76-85 would be appropriate.  Colonoscopy: Not on file  FOBT/FIT: Not on file  Cologuard: Not on file  Sigmoidoscopy: Not on file    Screening Not Indicated     Breast Cancer Screening Age: 40+ years old  Frequency: every 1-2 years  Not required if history of left and right mastectomy Mammogram: 09/14/2021    Screening Not Indicated   Cervical Cancer Screening Between the ages of 21-29, pap smear recommended once every 3 years.   Between the ages of 30-65, can perform pap smear with HPV co-testing every 5 years.   Recommendations may differ for women with a history of total hysterectomy, cervical cancer, or abnormal pap smears in past. Pap Smear: Not on file    Screening Not Indicated   Hepatitis C Screening Once for adults born between 1945 and 1965  More frequently in patients at high risk for Hepatitis C Hep C Antibody: 03/08/2022    Screening Current   Diabetes Screening 1-2 times per year if you're at risk for diabetes or have pre-diabetes Fasting glucose: 129 mg/dL (10/22/2024)  A1C: 6.2 % (10/22/2024)  Screening Not Indicated  History Diabetes   Cholesterol Screening Once every 5 years if you don't have a lipid disorder. May order more often based on risk factors. Lipid panel: 10/22/2024    Screening Not Indicated  History Lipid Disorder     Other Preventive Screenings Covered by Medicare:  Abdominal Aortic Aneurysm (AAA) Screening: covered once if your at risk. You're considered to be at risk if you have a family history of AAA.  Lung Cancer Screening: covers low dose CT scan once per year if you meet all of the following conditions: (1) Age 55-77; (2) No signs or symptoms of lung cancer; (3) Current smoker or have quit smoking within the last 15 years; (4) You have a tobacco smoking history of at least 20 pack years (packs per day multiplied by number of years you smoked); (5) You get a written order from a healthcare provider.  Glaucoma Screening: covered annually if you're considered high risk: (1) You have diabetes OR (2) Family  history of glaucoma OR (3)  aged 50 and older OR (4)  American aged 65 and older  Osteoporosis Screening: covered every 2 years if you meet one of the following conditions: (1) You're estrogen deficient and at risk for osteoporosis based off medical history and other findings; (2) Have a vertebral abnormality; (3) On glucocorticoid therapy for more than 3 months; (4) Have primary hyperparathyroidism; (5) On osteoporosis medications and need to assess response to drug therapy.   Last bone density test (DXA Scan): 04/03/2024.  HIV Screening: covered annually if you're between the age of 15-65. Also covered annually if you are younger than 15 and older than 65 with risk factors for HIV infection. For pregnant patients, it is covered up to 3 times per pregnancy.    Immunizations:  Immunization Recommendations   Influenza Vaccine Annual influenza vaccination during flu season is recommended for all persons aged >= 6 months who do not have contraindications   Pneumococcal Vaccine   * Pneumococcal conjugate vaccine = PCV13 (Prevnar 13), PCV15 (Vaxneuvance), PCV20 (Prevnar 20)  * Pneumococcal polysaccharide vaccine = PPSV23 (Pneumovax) Adults 19-63 yo with certain risk factors or if 65+ yo  If never received any pneumonia vaccine: recommend Prevnar 20 (PCV20)  Give PCV20 if previously received 1 dose of PCV13 or PPSV23   Hepatitis B Vaccine 3 dose series if at intermediate or high risk (ex: diabetes, end stage renal disease, liver disease)   Respiratory syncytial virus (RSV) Vaccine - COVERED BY MEDICARE PART D  * RSVPreF3 (Arexvy) CDC recommends that adults 60 years of age and older may receive a single dose of RSV vaccine using shared clinical decision-making (SCDM)   Tetanus (Td) Vaccine - COST NOT COVERED BY MEDICARE PART B Following completion of primary series, a booster dose should be given every 10 years to maintain immunity against tetanus. Td may also be given as tetanus wound prophylaxis.    Tdap Vaccine - COST NOT COVERED BY MEDICARE PART B Recommended at least once for all adults. For pregnant patients, recommended with each pregnancy.   Shingles Vaccine (Shingrix) - COST NOT COVERED BY MEDICARE PART B  2 shot series recommended in those 19 years and older who have or will have weakened immune systems or those 50 years and older     Health Maintenance Due:      Topic Date Due   • DXA SCAN  04/03/2026   • Hepatitis C Screening  Completed     Immunizations Due:  There are no preventive care reminders to display for this patient.  Advance Directives   What are advance directives?  Advance directives are legal documents that state your wishes and plans for medical care. These plans are made ahead of time in case you lose your ability to make decisions for yourself. Advance directives can apply to any medical decision, such as the treatments you want, and if you want to donate organs.   What are the types of advance directives?  There are many types of advance directives, and each state has rules about how to use them. You may choose a combination of any of the following:  Living will:  This is a written record of the treatment you want. You can also choose which treatments you do not want, which to limit, and which to stop at a certain time. This includes surgery, medicine, IV fluid, and tube feedings.   Durable power of  for healthcare (DPAHC):  This is a written record that states who you want to make healthcare choices for you when you are unable to make them for yourself. This person, called a proxy, is usually a family member or a friend. You may choose more than 1 proxy.  Do not resuscitate (DNR) order:  A DNR order is used in case your heart stops beating or you stop breathing. It is a request not to have certain forms of treatment, such as CPR. A DNR order may be included in other types of advance directives.  Medical directive:  This covers the care that you want if you are in a coma,  near death, or unable to make decisions for yourself. You can list the treatments you want for each condition. Treatment may include pain medicine, surgery, blood transfusions, dialysis, IV or tube feedings, and a ventilator (breathing machine).  Values history:  This document has questions about your views, beliefs, and how you feel and think about life. This information can help others choose the care that you would choose.  Why are advance directives important?  An advance directive helps you control your care. Although spoken wishes may be used, it is better to have your wishes written down. Spoken wishes can be misunderstood, or not followed. Treatments may be given even if you do not want them. An advance directive may make it easier for your family to make difficult choices about your care.   Weight Management   Why it is important to manage your weight:  Being overweight increases your risk of health conditions such as heart disease, high blood pressure, type 2 diabetes, and certain types of cancer. It can also increase your risk for osteoarthritis, sleep apnea, and other respiratory problems. Aim for a slow, steady weight loss. Even a small amount of weight loss can lower your risk of health problems.  How to lose weight safely:  A safe and healthy way to lose weight is to eat fewer calories and get regular exercise. You can lose up about 1 pound a week by decreasing the number of calories you eat by 500 calories each day.   Healthy meal plan for weight management:  A healthy meal plan includes a variety of foods, contains fewer calories, and helps you stay healthy. A healthy meal plan includes the following:  Eat whole-grain foods more often.  A healthy meal plan should contain fiber. Fiber is the part of grains, fruits, and vegetables that is not broken down by your body. Whole-grain foods are healthy and provide extra fiber in your diet. Some examples of whole-grain foods are whole-wheat breads and  pastas, oatmeal, brown rice, and bulgur.  Eat a variety of vegetables every day.  Include dark, leafy greens such as spinach, kale, vickie greens, and mustard greens. Eat yellow and orange vegetables such as carrots, sweet potatoes, and winter squash.   Eat a variety of fruits every day.  Choose fresh or canned fruit (canned in its own juice or light syrup) instead of juice. Fruit juice has very little or no fiber.  Eat low-fat dairy foods.  Drink fat-free (skim) milk or 1% milk. Eat fat-free yogurt and low-fat cottage cheese. Try low-fat cheeses such as mozzarella and other reduced-fat cheeses.  Choose meat and other protein foods that are low in fat.  Choose beans or other legumes such as split peas or lentils. Choose fish, skinless poultry (chicken or turkey), or lean cuts of red meat (beef or pork). Before you cook meat or poultry, cut off any visible fat.   Use less fat and oil.  Try baking foods instead of frying them. Add less fat, such as margarine, sour cream, regular salad dressing and mayonnaise to foods. Eat fewer high-fat foods. Some examples of high-fat foods include french fries, doughnuts, ice cream, and cakes.  Eat fewer sweets.  Limit foods and drinks that are high in sugar. This includes candy, cookies, regular soda, and sweetened drinks.  Exercise:  Exercise at least 30 minutes per day on most days of the week. Some examples of exercise include walking, biking, dancing, and swimming. You can also fit in more physical activity by taking the stairs instead of the elevator or parking farther away from stores. Ask your healthcare provider about the best exercise plan for you.      © Copyright [employer] 2018 Information is for End User's use only and may not be sold, redistributed or otherwise used for commercial purposes. All illustrations and images included in CareNotes® are the copyrighted property of [employer] Inc[employer] or [employer]

## 2024-11-14 NOTE — ASSESSMENT & PLAN NOTE
Lab Results   Component Value Date    HGBA1C 6.2 (H) 10/22/2024       Orders:    Comprehensive metabolic panel; Future    Lipid panel; Future    Hemoglobin A1C; Future    TSH, 3rd generation; Future

## 2024-11-14 NOTE — ASSESSMENT & PLAN NOTE
Blood pressure at home has been somewhat variable but generally well-controlled with systolic blood pressure in the 130s to 140s and diastolic pressure in the 60s to 70s.

## 2024-11-14 NOTE — ASSESSMENT & PLAN NOTE
Lab Results   Component Value Date    EGFR 65 04/17/2024    EGFR 59 11/01/2023    EGFR 53 05/01/2023    CREATININE 0.83 10/22/2024    CREATININE 0.85 04/17/2024    CREATININE 0.93 11/01/2023     Improved from prior.

## 2024-11-14 NOTE — PROGRESS NOTES
Assessment & Plan  Encounter for Medicare annual wellness exam  See other note.  Confirmed advanced directives today.       Benign essential HTN  Blood pressure at home has been somewhat variable but generally well-controlled with systolic blood pressure in the 130s to 140s and diastolic pressure in the 60s to 70s.       Type 2 diabetes mellitus with stage 3a chronic kidney disease, without long-term current use of insulin (HCC)  Well-controlled on metformin twice a day.  Patient has been losing interest in food and is slowly losing weight.  Lab Results   Component Value Date    HGBA1C 6.2 (H) 10/22/2024       Orders:    Comprehensive metabolic panel; Future    Lipid panel; Future    Hemoglobin A1C; Future    TSH, 3rd generation; Future    Hypercholesterolemia  Very well-controlled.  Suspect she does not need the 20 mg of Lipitor.  Will lower dose to 10 mg and repeat blood work prior to follow-up.  Orders:    atorvastatin (LIPITOR) 10 mg tablet; Take 1 tablet (10 mg total) by mouth daily    Comprehensive metabolic panel; Future    Lipid panel; Future    Hemoglobin A1C; Future    TSH, 3rd generation; Future    Stage 2 chronic kidney disease  Lab Results   Component Value Date    EGFR 65 04/17/2024    EGFR 59 11/01/2023    EGFR 53 05/01/2023    CREATININE 0.83 10/22/2024    CREATININE 0.85 04/17/2024    CREATININE 0.93 11/01/2023     Improved from prior.       Acquired hypothyroidism  Well-controlled.  Orders:    Comprehensive metabolic panel; Future    Lipid panel; Future    Hemoglobin A1C; Future    TSH, 3rd generation; Future    Mild late onset Alzheimer's dementia without behavioral disturbance, psychotic disturbance, mood disturbance, or anxiety (HCC)  Patient now following with geriatrics.       Depression, recurrent (HCC)    Patient was started on Zoloft 25 mg by geriatrics.  She has had more loss of interest in things, less socialization, appears down today.  I will check with Dr. Huebr to see if he agrees  with increasing the dose to 50 mg.       Counseling regarding advanced directives  Confirmed wishes today with the patient and her daughter.  She confirms she is a DNR and her 2 daughters would be her medical power of .            Return in about 6 months (around 5/14/2025) for labs CC .    Subjective:   Chelsea is a 79 y.o. adult here today for a follow-up on her current medical conditions:    Patient Active Problem List   Diagnosis    Benign essential HTN    Type 2 diabetes mellitus with stage 3a chronic kidney disease, without long-term current use of insulin (HCC)    Osteopenia of multiple sites    Hypercholesterolemia    Stage 2 chronic kidney disease    Counseling regarding advanced directives    Acquired hypothyroidism    Depression, recurrent (HCC)    Mild late onset Alzheimer's dementia without behavioral disturbance, psychotic disturbance, mood disturbance, or anxiety ([facility])         Patient Care Team:  Fabiola Le DO as PCP - General (Family Medicine)  Jose Huber MD (Geriatric Medicine)    Current Medications:  Current Outpatient Medications   Medication Sig Dispense Refill    amLODIPine (NORVASC) 5 mg tablet take 1 tablet by mouth one time daily. take in addition to the 2.5mg tablet for total daily does:7.5mg 90 tablet 1    atorvastatin (LIPITOR) 10 mg tablet Take 1 tablet (10 mg total) by mouth daily 90 tablet 2    docusate sodium (COLACE) 100 mg capsule Take 100 mg by mouth 2 (two) times a day      glucose blood (OneTouch Ultra) test strip Check blood sugars once daily. Please substitute with appropriate alternative as covered by patient's insurance. Dx: E11.65 100 each 3    losartan (COZAAR) 50 mg tablet TAKE ONE TABLET BY MOUTH TWICE DAILY 180 tablet 1    metFORMIN (GLUCOPHAGE-XR) 500 mg 24 hr tablet TAKE ONE TABLET BY MOUTH TWICE DAILY 180 tablet 1    multivitamin (THERAGRAN) TABS Take 1 tablet by mouth daily      Omega-3 Fatty Acids (fish oil) 1,000 mg Take 1,000 mg by mouth  daily      OneTouch Delica Lancets 33G MISC Check blood sugars once daily. Please substitute with appropriate alternative as covered by patient's insurance. Dx: E11.65 100 each 3    Zoloft 25 MG tablet Take 1 tablet (25 mg total) by mouth daily 90 tablet 2     No current facility-administered medications for this visit.       HPI:  Chief Complaint   Patient presents with    Medicare Wellness Visit     -- Above per clinical staff and reviewed. --    PHQ-2/9 Depression Screening    Little interest or pleasure in doing things: 1 - several days  Feeling down, depressed, or hopeless: 1 - several days  Trouble falling or staying asleep, or sleeping too much: 0 - not at all  Feeling tired or having little energy: 1 - several days  Poor appetite or overeatin - not at all  Feeling bad about yourself - or that you are a failure or have let yourself or your family down: 0 - not at all  Trouble concentrating on things, such as reading the newspaper or watching television: 0 - not at all  Moving or speaking so slowly that other people could have noticed. Or the opposite - being so fidgety or restless that you have been moving around a lot more than usual: 0 - not at all  Thoughts that you would be better off dead, or of hurting yourself in some way: 0 - not at all  PHQ-9 Score: 3  PHQ-9 Interpretation: No or Minimal depression       MARLY-7 Flowsheet Screening      Flowsheet Row Most Recent Value   Over the last two weeks, how often have you been bothered by the following problems?     Feeling nervous, anxious, or on edge 1   Not being able to stop or control worrying 0   Worrying too much about different things 0   Trouble relaxing  0   Being so restless that it's hard to sit still 0   Becoming easily annoyed or irritable  0   Feeling afraid as if something awful might happen 1   How difficult have these problems made it for you to do your work, take care of things at home, or get along with other people?  Somewhat difficult    MARLY Score  2             MWV due 11/7/24  Oct 2024 labs done - A1C improved 6.5 to 6.2, , negative RPR,. [place]aml TSH, ferritin folate, chol well controlled with LDL 49 to 39 , GFR 65 urine microalbumin:creatinine ratio normal, B12 462  (Imaging from [facility] - need n  ew record release if need more)     Not driving, not cooking other than microwave  Banking, laundry, feeding herself, feeding cat, routine, ladies night  . dtr Blanca Posadas   no Tdap found on records we received. (slw 4/12/22)   Today:  Referral to [facility] and on zoloft   PT came to the house   Not getting much social interaction, does not feel comfortable going out   Every Tuesday goes out with her friends  Geriatrics did not feel aricept/namenda would be helpful   Eating less, not as interested   No side effects from zoloft  No sleep issues  Walking less, not independently   Blood pressure up a little   130-140/60-70s     The following portions of the patient's history were reviewed and updated as appropriate: allergies, current medications, past family history, past medical history, past social history, past surgical history and problem list.    Objective:  Vitals:  /50 (BP Location: Left arm, Patient Position: Sitting, Cuff Size: Large)   Pulse 75   Temp (!) 96.8 °F (36 °C) (Tympanic)   Resp 16   Ht 5' (1.524 m)   Wt 66.2 kg (146 lb)   SpO2 98%   BMI 28.51 kg/m²    Wt Readings from Last 3 Encounters:   11/14/24 66.2 kg (146 lb)   10/14/24 67.1 kg (148 lb)   06/10/24 68 kg (150 lb)      BP Readings from Last 3 Encounters:   11/14/24 136/50   10/14/24 154/66   07/29/24 160/70        Review of Systems   She has no other concerns. No unexpected weight changes. No chest pain, SOB, or palpitations. No GERD. No changes in bowels or bladder. Sleeping well.  Positive mood changes.  Positive loss of appetite    Physical Exam   Constitutional:  she appears well-developed and well-nourished.  HENT: Head: Normocephalic.   Neck:  Neck supple.   Cardiovascular: Normal rate, regular rhythm and normal heart sounds.   Pulmonary/Chest: Effort normal and breath sounds normal. No wheezes, rales, or rhonchi.   Abdominal: Soft. Bowel sounds are normal. There is no tenderness. No hepatosplenomegaly.   Musculoskeletal: she exhibits no edema.   Lymphadenopathy: she has no cervical adenopathy.   Neurological: she is alert.  She looks to her daughter to help her answer details such as what her DNR wishes are.  She is able to answer review of systems in an appropriate way.  She is able to get up from the bite exam table unassisted and slowly walk.  Gait is steady.  No cogwheel rigidity.  Skin: Skin is warm and dry.   Psychiatric: she has a depressed mood and more flat affect. her behavior is normal. Thought content normal.  Her voice is very quiet today.

## 2024-11-14 NOTE — ASSESSMENT & PLAN NOTE
Lab Results   Component Value Date    EGFR 65 04/17/2024    EGFR 59 11/01/2023    EGFR 53 05/01/2023    CREATININE 0.83 10/22/2024    CREATININE 0.85 04/17/2024    CREATININE 0.93 11/01/2023

## 2024-11-14 NOTE — ASSESSMENT & PLAN NOTE
Orders:    atorvastatin (LIPITOR) 10 mg tablet; Take 1 tablet (10 mg total) by mouth daily    Comprehensive metabolic panel; Future    Lipid panel; Future    Hemoglobin A1C; Future    TSH, 3rd generation; Future

## 2024-11-14 NOTE — ASSESSMENT & PLAN NOTE
Well-controlled.  Orders:    Comprehensive metabolic panel; Future    Lipid panel; Future    Hemoglobin A1C; Future    TSH, 3rd generation; Future

## 2024-11-16 ENCOUNTER — PATIENT MESSAGE (OUTPATIENT)
Dept: FAMILY MEDICINE CLINIC | Facility: CLINIC | Age: 79
End: 2024-11-16

## 2024-11-16 DIAGNOSIS — F32.0 MAJOR DEPRESSIVE DISORDER, SINGLE EPISODE, MILD (HCC): Primary | ICD-10-CM

## 2024-12-06 ENCOUNTER — EVALUATION (OUTPATIENT)
Dept: SPEECH THERAPY | Facility: CLINIC | Age: 79
End: 2024-12-06
Payer: MEDICARE

## 2024-12-06 ENCOUNTER — EVALUATION (OUTPATIENT)
Dept: OCCUPATIONAL THERAPY | Facility: CLINIC | Age: 79
End: 2024-12-06
Payer: MEDICARE

## 2024-12-06 DIAGNOSIS — R41.841 COGNITIVE COMMUNICATION DEFICIT: Primary | ICD-10-CM

## 2024-12-06 DIAGNOSIS — G30.1 MILD LATE ONSET ALZHEIMER'S DEMENTIA WITHOUT BEHAVIORAL DISTURBANCE, PSYCHOTIC DISTURBANCE, MOOD DISTURBANCE, OR ANXIETY (HCC): Primary | ICD-10-CM

## 2024-12-06 DIAGNOSIS — F02.A0 MILD LATE ONSET ALZHEIMER'S DEMENTIA WITHOUT BEHAVIORAL DISTURBANCE, PSYCHOTIC DISTURBANCE, MOOD DISTURBANCE, OR ANXIETY (HCC): ICD-10-CM

## 2024-12-06 DIAGNOSIS — G30.1 MILD LATE ONSET ALZHEIMER'S DEMENTIA WITHOUT BEHAVIORAL DISTURBANCE, PSYCHOTIC DISTURBANCE, MOOD DISTURBANCE, OR ANXIETY (HCC): ICD-10-CM

## 2024-12-06 DIAGNOSIS — R47.89 WORD FINDING DIFFICULTY: ICD-10-CM

## 2024-12-06 DIAGNOSIS — R49.8 OTHER VOICE AND RESONANCE DISORDERS: ICD-10-CM

## 2024-12-06 DIAGNOSIS — F02.A0 MILD LATE ONSET ALZHEIMER'S DEMENTIA WITHOUT BEHAVIORAL DISTURBANCE, PSYCHOTIC DISTURBANCE, MOOD DISTURBANCE, OR ANXIETY (HCC): Primary | ICD-10-CM

## 2024-12-06 PROCEDURE — 92523 SPEECH SOUND LANG COMPREHEN: CPT

## 2024-12-06 PROCEDURE — 97530 THERAPEUTIC ACTIVITIES: CPT

## 2024-12-06 PROCEDURE — 97166 OT EVAL MOD COMPLEX 45 MIN: CPT

## 2024-12-06 NOTE — PROGRESS NOTES
OCCUPATIONAL THERAPY INITIAL EVALUATION:      12/6/2024  Chelsea Bowles  1945  [id number]  Jose Huber,*   Diagnosis ICD-10-CM Associated Orders   1. Mild late onset Alzheimer's dementia without behavioral disturbance, psychotic disturbance, mood disturbance, or anxiety (HCC)  G30.1 Ambulatory Referral to Occupational Therapy    F02.A0             Assessment/Plan    SKILLED ANALYSIS:  Pt, Chelsea, is a 79 y.o. adult referred to Occupational Therapy s/p Mild late onset Alzheimer's dementia without behavioral disturbance, psychotic disturbance, mood disturbance, or anxiety (HCC) [G30.1, F02.A0].  Pt present today with her daughter for evaluation.  Pt participated in skilled OT evaluation and following formalized testing as well as clinical observation, Pt presents with the following areas of deficit: slower processing speeds with cognitive tasks, requiring repeat of directions with difficulty processing new tasks, decreased mental flexibility, difficulty alternating attention, and report of memory difficulties occurring over several years.  Pt's daughter also reports time management changes 2/2 overall decreased engagement in IADLs, difficulty with change and learning new situations, and difficulty using her Iphone / TV remote. Completion of Trail making test part A with completion time in 1 min 53 seconds, overall slower rate compared to age/normative data.  Facilitated [place] making test part B with pt requiring maximal cues overall for alternating and terminated task 2/2 difficulty recalling placement.  Subjectively, pt also demo decreased MMT strength proximally in BUE's and slightly decreased  strength compared to age norms. Goals added below and discussed outlook for OT services. Pt will benefit from skilled Occupational Therapy services 2x/week for 8 weeks with focus on memory, strengthening, and HEP with Self-care, There act, There ex.  Pt in agreement with POC.          POC expires Unit  limit Auth Expiration date PT/OT/ST + Visit Limit?   2/6/2025 Neuro 6 OT/PT No auth BOMN                           Visit/Unit Tracking  AUTH Status:  Date 12/6             N/A Used 1              Remaining  7                  Duration in weeks: 2x/week, 8 weeks  Plan of care beginning date: 12/6/24  Plan of care expiration date: 2/6/25  PN #1 due:  1/6/25    Precautions: Cognitive, HTN, Type II DM      GOALS    Short Term Goals (4 weeks):  - Pt / daughter will demo good understanding and carryover of external memory strategies into everyday life to maintain QOL and independence   - Pt will maintain attention to task for 25+ minutes in multimodal environment demo improved focus and sustained attention skills  - Pt will improve verbal direction following of 1-2 steps demo improved processing and retention of information  - Facilitate strengthening/endurance training tolerating x 20 min for cognitive health and wellness      Long Term Goals (8 weeks):   - Pt will improve understanding of using cell phone functions (games, apps, logging in, making phone calls) for improved safety and indep with IADLs  - Patient and/or family will demo G understanding and use of memory book to inc pt's overall engagement in life roles and to dec frustrations with STM/delayed memory loss (as needed)  - Pt will be independent with cognitive activities and HEP at home with completion of at least 1 task per day to improve daily habits and routines  - Pt will improve B/L  strength by 2-5 lbs through exercises, functional strengthening activities for improved indep with ADLs    *Additional goals to be added with completion of cognitive based testing      Subjective    PATIENT GOAL:  Cognitive function.       HISTORY OF PRESENT ILLNESS:     Pt is a 79 y.o. adult who was referred to Occupational Therapy s/p  Mild late onset Alzheimer's dementia without behavioral disturbance, psychotic disturbance, mood disturbance, or anxiety (HCC) [G30.1,  "F02.A0]    Pt reports having memory changes over the last several years, although today reports it seems better.      A lot difficulties with dates. Trouble using the Iphone and TV remote at home.     She writes down her appointments.  Recently did a 100 piece puzzle. Has some word finding books.     Seeing Dr. Huber from [facility] ...   \"Memory loss:  -[facility] today  with deficits and visuospatial, executive function, draw clock, attention, language, delayed recall and orientation  - Dementia, likely Alzheimer's type , mild (FAST stage IV)\"     Has a life Alert system, has not worn yet.    Feels her speech is soft. Some word finding difficulty, losing train of thought and mid sentence losing what she wants to say.    Possibly some weakness.     She lives in a 2 story house, room is on the first floor.  She is performing her ADLs independently. Her medications are organized in a pill box container (AM/PM) and was recently managed herself but now her daughter puts her medications in a cup that she has to take.  Doesn't do many financials, daughter does it.  Does not use the stove.  Not much she needs to do during the day.     Every other Tuesday she goes to dinner with her friends. Stopped driving 2-3 years ago.  Walks a lot.     Denies any vision changes. Wears glasses for distance.          PMH:   Past Medical History:   Diagnosis Date    Diabetes mellitus (HCC)     Hypertension     Major depressive disorder, single episode, mild (HCC) 4/10/2023       Past Surgical Hx:   Past Surgical History:   Procedure Laterality Date    CHOLECYSTECTOMY      WRIST FRACTURE SURGERY Right 1982          Pain Levels  Restin    With Activity:  0        Objective    IMPAIRMENTS SECTION      MOTOR     ROM BUE's all WFL  Near full ROM in shoulders    MMT  RUE 4-/5   LUE 4-/5    Dynamometer  R hand - 25 lbs .. Low   L hand - 30 lbs  .. Low             COGNITIVE CHECKLIST:  *Patient indicated that she is experiencing the following " difficulties:  Memory: Remembering what people have told you and Remembering the date/day of the week    Attention: Losing your train of thought    Processing: Following directions    Communication: Word finding in conversation and Expressing thoughts and ideas fluently            Trail making Part A and Part B: TMT looks at visual search, scanning, speed of processing, mental flexibility, and executive functions   Part A: 1 min 53 seconds with 0 cues, needed repeat of directions   Part B: 3.5 min to complete 1-A up to 4-D with maximal cues for task directions, alternating, and problem solving  Indicating deficits: Part A deficit > 50 seconds and Part B deficit > 130 seconds for age related norms          SLUMS EXAMINATION   - DNT      Motor-Free Visual Perception Test (4th Edition):  Is an individually administered assessment of visual-perceptual skills commonly used in everyday activities.  -  DNT          PLANNED THERAPY INTERVENTIONS:  Internal and external memory aides  Multimatrix for saccades/ visual clutter/attention  Multi-modal environment  Sustained/alternating/divided attention  Temporal Awareness: Organize the Hour activities  Memory and mental manipulation  Auditory processing with immediate recall  Memory retention with immediate and delayed recall  Edu on cog/vision apps  BITS - memory  Blaze pods - alternating attention

## 2024-12-06 NOTE — PROGRESS NOTES
Speech-Language Pathology Initial Evaluation    Today's date: 2024   Patient’s name: Chelsea Bowles  : 1945  MRN: [id number]  Safety measures: Alzheimer's   Referring provider: Jose Huber,*    Encounter Diagnosis     ICD-10-CM    1. Cognitive communication deficit  R41.841       2. Word finding difficulty  R47.89       3. Other voice and resonance disorders  R49.8       4. Mild late onset Alzheimer's dementia without behavioral disturbance, psychotic disturbance, mood disturbance, or anxiety (HCC)  G30.1 Ambulatory Referral to Speech Therapy    F02.A0           Assessment:  Patient presents with mild cognitive-linguistic deficits characterized by decreased word finding in conversation secondary to Alzheimer's.  During testing today, Patient demonstrated strengths with confrontational naming of high frequency, common items (e.g. bed, wreath, house, whistle) and naming word opposites.  Patient demonstrated weaknesses with confrontational naming low frequency words (e.g. sphinx, scroll, palette), finishing analogies, and naming word similarities.  It is suspected that Patient had increased difficulty with naming word similarities due to the shift from antonyms to synonyms.  It should be noted that Patient's voice was screened briefly at the end of today's evaluation.  Patient was noted to have decreased breath support, as seen during MPT and S/Z ratio, as well as decreased coordination between respiratory and phonatory systems.  At this time, it is recommended that Patient be seen for skilled outpatient Speech Therapy to target the goals mentioned below for both language and voice.        Short Term Goals  1. Patient will utilize word finding strategies during semantic feature analysis treatment activity for improved naming and verbal expression skills, to be achieved in 3 months.     2. Patient will complete word generation tasks (e.g., analogies, category matrices, etc.) with 80% accuracy  using word finding strategies to facilitate improved word retrieval skills, to be achieved in 3 months.    3. Patient will be educated on vocal hygiene and demonstrate understanding of recommendations to facilitate increased quality of voice, to be achieved in 3 months.    4. Patient will be educated on diaphragmatic breathing (versus clavicular breathing) to establish controlled, rhythmic breathing, to be achieved in 3 months.    5. Patient will participate in a full voice evaluation to further assess vocal disorder, to be achieved in 1 month.      Long Term Goals  1. Patient will demonstrate cognitive-communication skills consistent with age and education given use of compensatory strategies when needed to resume baseline activities and responsibilities in home, community, and work/school settings by discharge.     2. Patient will complete higher-level expressive language tasks (e.g., word definitions, idioms, synonym/antonyms, etc) with 80% accuracy to improve functional communication skills by discharge.     3. Patient will demonstrate adequate verbal expression during conversation without breakdowns or word finding deficits by discharge.       4. Patient will improve vocal quality for increased functional communication by discharge.     5. Patient will independently utilize vocal function exercises to maintain best level of voice to facilitate increased generational skills into conversational speech by discharge.        Plan:  Patient would benefit from outpatient skilled Speech Therapy services: Voice therapy and Cognitive-linguistic therapy    Frequency: 1-2x weekly  Duration: 3 months    Intervention certification from: 12/6/2024  Intervention certification to: 03/06/2025      Subjective:  History of present illness: Patient is a 79 y.o. adult who was referred to outpatient skilled Speech Therapy services for a cognitive-linguistic evaluation.  Patient recently met with Geriatric Medicine (Dr. Huber) on  "10/14/2024 with concerns regarding memory and cognition.  Patient was presented with MoCA testing, scoring 12/30 possible points.  Patient was also seen by skilled outpatient Occupational Therapy today who will be focusing on Patient's memory and ADLs.  Patient did report that she sometimes has trouble with word finding or losing her words when trying to speak in conversation.  Patient's daughter, Blanca, reports that Patient spends a lot of time at home by herself and there are some concerns with voicing as well.  Patient's low vocal intensity and raspy quality.  Patient has not been to ENT with her vocal concerns.     Patient's goal(s): \"To be better.\"     Pain: Absent (scale:  N/A )    Hearing: WFL  Vision: WFL (with glasses)     Home environment/lifestyle: At home with daughter, Blanca, and her family   Highest level of education: High school  Vocational status: Retired (Worked for  department)       Objective:  The [place] Naming Test-Second Edition (BNT-2) is a confrontational naming assessment that asks patients to provide the best name for a given picture. It was designed to detect word-finding impairments. The BNT-2 consists of 60 pictures, ordered from easiest to most difficult. Stimulus cues, including semantic, phonemic, and written information, are provided as necessary.      The following results were obtained during the administration of the assessment:     Summary of Scores: Score:   1. Number of spontaneously given correct response: 46/60       2. Number of stimulus cues given:  14   3. Number of correct responses following a stimulus cue:  47/60       *TOTAL SCORE: 47/60   Percentile Rank:  75%ile       Additional Cuein. Number of phonemic cues: 13   5. Number of correct responses following the phonemic cue: 50/60       6. Number of multiple choices given:  10   7. Number of correct choices: 58/60       The Test of Adult Language - Fourth Edition (TOAL-4) is a standardized, " norm-referenced assessment measuring important communicative abilities in spoken and written language. The test in normed on individuals 12:0-24:11. The TOAL-4 has 6 subtests; their results are reported as percentile ranks and scaled scores. The results of the TOAL-4 are generally used for three purposes; (a) to identify adolescents and adults who score significantly below their peers and therefore might need help improving their language proficiency, (b) to determine areas of relative strength and weakness among language abilities, and (c) to serve as a research tool in studies investigating language problems in adolescents and adults. Due to time constraints, only portions of the standardized assessment were administered on this date of service.    Subtest: Raw Score:   1. Word Opposites 15/34   3. Spoken Analogies 6/26   4. Word Similarities 1/40       VOICE SCREENING:    Objective Measurements/Voice Parameters:  RESPIRATORY EFFICIENCY:   -S:Z Ratio Task: Patient was instructed to sustain the sounds /s/ and /z/ to examine the coordination and efficiency of respiration and voice production. Normative data suggests that adults can prolong these sounds for 20-25 seconds. Ratios of 1.4 and above are consistent with laryngeal inefficiency, and ratios of 2.0 and above are suggestive of vocal fold pathology. Patient's S:Z ratio was 7.76 (9.94 sec : 1.28 sec).     -Maximum Phonation Time: Patient was instructed to sustain /a/ to measure respiratory and laryngeal coordination and efficiency. Adults are typically able to prolong vowels sound for 15-20 seconds. Reduced MPT may suggest poor respiratory support, or poor medial glottal closure. Patient's MPT was 3.03 seconds.      Treatment:  -No treatment was performed on this date of service.      Visit Tracking:  POC   Expires Auth Expiration Date ST Visit Limit   03/06/2025 12/31/2024 BOMN          Visit/Unit Tracking:  Auth Status Date 12/06/2024   BOMN Used 1     Remaining 9       Intervention Comments:  45 minutest test administration

## 2024-12-12 ENCOUNTER — OFFICE VISIT (OUTPATIENT)
Dept: OCCUPATIONAL THERAPY | Facility: CLINIC | Age: 79
End: 2024-12-12
Payer: MEDICARE

## 2024-12-12 DIAGNOSIS — F02.A0 MILD LATE ONSET ALZHEIMER'S DEMENTIA WITHOUT BEHAVIORAL DISTURBANCE, PSYCHOTIC DISTURBANCE, MOOD DISTURBANCE, OR ANXIETY (HCC): Primary | ICD-10-CM

## 2024-12-12 DIAGNOSIS — G30.1 MILD LATE ONSET ALZHEIMER'S DEMENTIA WITHOUT BEHAVIORAL DISTURBANCE, PSYCHOTIC DISTURBANCE, MOOD DISTURBANCE, OR ANXIETY (HCC): Primary | ICD-10-CM

## 2024-12-12 PROCEDURE — 97530 THERAPEUTIC ACTIVITIES: CPT

## 2024-12-12 NOTE — PROGRESS NOTES
Occupational Therapy Daily Note:    Today's date: 2024  Patient name: Chelsea Bowles  : 1945  MRN: [id number]  Referring provider: Jose Huber,*  Dx:   Encounter Diagnosis   Name Primary?    Mild late onset Alzheimer's dementia without behavioral disturbance, psychotic disturbance, mood disturbance, or anxiety (HCC) Yes                    POC expires Unit limit Auth Expiration date PT/OT/ST + Visit Limit?   2025 Neuro 6 OT/PT No auth BOMN                           Visit/Unit Tracking  AUTH Status:  Date             N/A Used 1 2             Remaining  7 6                 Duration in weeks: 2x/week, 8 weeks  Plan of care beginning date: 24  Plan of care expiration date: 25  PN #1 due:  25    Precautions: Cognitive, HTN, Type II DM      Subjective: I do word searches and puzzles.     Objective: See treatment below. Pt participated in skilled OT this date with focus to working memory, /VM skills, functional cognition, visual memory, verbal direction follow, sustained attention, and activity tolerance/endurance, for increased safety and engagement in ADL/IADL tasks.     Thera Act: Pt initiated session with Detectives Looking Chart x 5 cards, increased time, and mod verbal cues at times for locating items, however, pt at times locating items with ease and independence. Pt then moving to Geoboard Design x 1 with increased difficulty with transferring skills from card to design board. Similar difficulty noted with image from looking at card and searching for it in clutter, pt often looking back and forth to recall visual of item. Pt at times skipping over areas when scanning.     Assessment: Tolerated treatment well. Pt would benefit from continued work with visual scanning, transferring skills, and working memory tasks. Patient would benefit from continued skilled OT.    Plan: Continued skilled OT per POC

## 2024-12-18 ENCOUNTER — OFFICE VISIT (OUTPATIENT)
Dept: OCCUPATIONAL THERAPY | Facility: CLINIC | Age: 79
End: 2024-12-18
Payer: MEDICARE

## 2024-12-18 ENCOUNTER — OFFICE VISIT (OUTPATIENT)
Dept: SPEECH THERAPY | Facility: CLINIC | Age: 79
End: 2024-12-18
Payer: MEDICARE

## 2024-12-18 DIAGNOSIS — R47.89 WORD FINDING DIFFICULTY: ICD-10-CM

## 2024-12-18 DIAGNOSIS — G30.1 MILD LATE ONSET ALZHEIMER'S DEMENTIA WITHOUT BEHAVIORAL DISTURBANCE, PSYCHOTIC DISTURBANCE, MOOD DISTURBANCE, OR ANXIETY (HCC): ICD-10-CM

## 2024-12-18 DIAGNOSIS — F02.A0 MILD LATE ONSET ALZHEIMER'S DEMENTIA WITHOUT BEHAVIORAL DISTURBANCE, PSYCHOTIC DISTURBANCE, MOOD DISTURBANCE, OR ANXIETY (HCC): Primary | ICD-10-CM

## 2024-12-18 DIAGNOSIS — R41.841 COGNITIVE COMMUNICATION DEFICIT: Primary | ICD-10-CM

## 2024-12-18 DIAGNOSIS — F02.A0 MILD LATE ONSET ALZHEIMER'S DEMENTIA WITHOUT BEHAVIORAL DISTURBANCE, PSYCHOTIC DISTURBANCE, MOOD DISTURBANCE, OR ANXIETY (HCC): ICD-10-CM

## 2024-12-18 DIAGNOSIS — G30.1 MILD LATE ONSET ALZHEIMER'S DEMENTIA WITHOUT BEHAVIORAL DISTURBANCE, PSYCHOTIC DISTURBANCE, MOOD DISTURBANCE, OR ANXIETY (HCC): Primary | ICD-10-CM

## 2024-12-18 PROCEDURE — 92507 TX SP LANG VOICE COMM INDIV: CPT | Performed by: SPEECH-LANGUAGE PATHOLOGIST

## 2024-12-18 PROCEDURE — 97530 THERAPEUTIC ACTIVITIES: CPT

## 2024-12-18 NOTE — PROGRESS NOTES
Daily Speech Treatment Note    Today's date: 2024  Patient’s name: Chelsea Bolwes  : 1945  MRN: [id number]  Safety measures: Alzheimer's  Referring provider: Jose Huber,*      Encounter Diagnosis     ICD-10-CM    1. Cognitive communication deficit  R41.841       2. Word finding difficulty  R47.89       3. Mild late onset Alzheimer's dementia without behavioral disturbance, psychotic disturbance, mood disturbance, or anxiety ([facility])  G30.1     F02.A0         Visit Tracking:  POC   Expires Auth Expiration Date ST Visit Limit   2025 BOMN          Visit/Unit Tracking:  Auth Status Date 2024   BOMN Used 1 2    Remaining 9 8       Subjective/Behavioral:  -Patient received with her daughter, Blanca. She noted she was doing well, feeling good. She participated well with new clinician today.    Objective/Assessment:  -Patient's family member/caregiver was present during today's session.    Short Term Goals  1. Patient will utilize word finding strategies during semantic feature analysis treatment activity for improved naming and verbal expression skills, to be achieved in 3 months.     2. Patient will complete word generation tasks (e.g., analogies, category matrices, etc.) with 80% accuracy using word finding strategies to facilitate improved word retrieval skills, to be achieved in 3 months.    3. Patient will be educated on vocal hygiene and demonstrate understanding of recommendations to facilitate increased quality of voice, to be achieved in 3 months.    4. Patient will be educated on diaphragmatic breathing (versus clavicular breathing) to establish controlled, rhythmic breathing, to be achieved in 3 months.    5. Patient will participate in a full voice evaluation to further assess vocal disorder, to be achieved in 1 month.      Long Term Goals  1. Patient will demonstrate cognitive-communication skills consistent with age and education given use of  compensatory strategies when needed to resume baseline activities and responsibilities in home, community, and work/school settings by discharge.     2. Patient will complete higher-level expressive language tasks (e.g., word definitions, idioms, synonym/antonyms, etc) with 80% accuracy to improve functional communication skills by discharge.   Patient provided word and asked to provide antonym. Across 40 items, patient demonstrated 60% accuracy. With semantic cues, patient improved to 75% accuracy.   Skipped (deliberate, believe, reality)  Perseverated on deliberate, repeating it often throughout task.     Then provided word list and instructed patient to provide a synonym. She then wrote the synonym down. Spelling required minimal help. She demonstrated 70% accuracy with moderate cueing. She often gave them antonym, from previous task.     3. Patient will demonstrate adequate verbal expression during conversation without breakdowns or word finding deficits by discharge.       4. Patient will improve vocal quality for increased functional communication by discharge.     5. Patient will independently utilize vocal function exercises to maintain best level of voice to facilitate increased generational skills into conversational speech by discharge.        Plan:  -Continue with current plan of care.

## 2024-12-18 NOTE — PROGRESS NOTES
Occupational Therapy Daily Note:    Today's date: 2024  Patient name: Chelsea Bowles  : 1945  MRN: [id number]  Referring provider: Jose Huber,*  Dx:   Encounter Diagnosis   Name Primary?    Mild late onset Alzheimer's dementia without behavioral disturbance, psychotic disturbance, mood disturbance, or anxiety (HCC) Yes                      POC expires Unit limit Auth Expiration date PT/OT/ST + Visit Limit?   2025 Neuro 6 OT/PT No auth BOMN                           Visit/Unit Tracking  AUTH Status:  Date            N/A Used 1 2 3            Remaining  7 6 5                Duration in weeks: 2x/week, 8 weeks  Plan of care beginning date: 24  Plan of care expiration date: 25  PN #1 due:  25    Precautions: Cognitive, HTN, Type II DM      Subjective:  Reports getting a fall necklace 2 days ago, something new she is working with!   Pts daughter present for OT session.     Objective: See treatment below.    Thera Act:   Education on external memory strategies with focus on visuals, external calendars, to do lists, and cell phone use.  Modified cell phone (Iphone) and limited or deleted apps on home page to make it simple for pt to make phone calls and navigate important apps she uses including weather gwendolyn, phone call gwendolyn, photos, and bank gwendolyn.      Discussed repeat / rehearse strategy and ways to apply into daily life .  For example, when going into a bedroom for her fall necklace, to repeat to herself to grasp the necklace OR when assisting with cooking to repeat the ingredients she needs to get from the cabinet (oil and pepper) for example.     HEP - recommend one time a day - completing cog workbooks and labeling date/day of week on each page for completion as pt doesn't perform much activity at home. Reports walking around house a lot or watching TV.     Reviewed adapting env, a place for everything and everything in its place, creating habits/routines.  "    Memory task -   Reviewing use of phone calls on cell phone with pt opening phone and navigating to make a phone call to her daughter, Blanca.  Added challenge of identifying \"favorites\" if the phone gwendolyn is not opened up to this     Facilitated repeat / rehearse and alternating attention with studying 3 objects/visuals then repeating back and recalling after 1-2 min delay.  Completed 2 sets of 3 pictures with 100% recall after delay.     Assessment: Tolerated treatment well.  Patient would benefit from continued skilled OT.    Tolerated session well, pt/daughter demo G understanding of memory strategies and how to implement to daily life/ADLs/IADLs.  G recall after short delay of 3 visual pictures and ability to navigate cell phone to make a phone call.      Plan: Continued skilled OT per POC    "

## 2024-12-20 ENCOUNTER — OFFICE VISIT (OUTPATIENT)
Dept: OCCUPATIONAL THERAPY | Facility: CLINIC | Age: 79
End: 2024-12-20
Payer: MEDICARE

## 2024-12-20 DIAGNOSIS — R73.01 IFG (IMPAIRED FASTING GLUCOSE): ICD-10-CM

## 2024-12-20 DIAGNOSIS — I10 BENIGN ESSENTIAL HTN: ICD-10-CM

## 2024-12-20 DIAGNOSIS — F02.A0 MILD LATE ONSET ALZHEIMER'S DEMENTIA WITHOUT BEHAVIORAL DISTURBANCE, PSYCHOTIC DISTURBANCE, MOOD DISTURBANCE, OR ANXIETY (HCC): Primary | ICD-10-CM

## 2024-12-20 DIAGNOSIS — G30.1 MILD LATE ONSET ALZHEIMER'S DEMENTIA WITHOUT BEHAVIORAL DISTURBANCE, PSYCHOTIC DISTURBANCE, MOOD DISTURBANCE, OR ANXIETY (HCC): Primary | ICD-10-CM

## 2024-12-20 PROCEDURE — 97530 THERAPEUTIC ACTIVITIES: CPT

## 2024-12-20 RX ORDER — LOSARTAN POTASSIUM 50 MG/1
50 TABLET ORAL 2 TIMES DAILY
Qty: 180 TABLET | Refills: 1 | Status: SHIPPED | OUTPATIENT
Start: 2024-12-20

## 2024-12-20 NOTE — PROGRESS NOTES
"Occupational Therapy Daily Note:    Today's date: 2024  Patient name: Chelsea Bowles  : 1945  MRN: [id number]  Referring provider: Jose Huber,*  Dx:   Encounter Diagnosis   Name Primary?    Mild late onset Alzheimer's dementia without behavioral disturbance, psychotic disturbance, mood disturbance, or anxiety (HCC) Yes          POC expires Unit limit Auth Expiration date PT/OT/ST + Visit Limit?   2025 Neuro 6 OT/PT No auth BOMN                           Visit/Unit Tracking  AUTH Status:  Date           N/A Used 1 2 3 4           Remaining  7 6 5 4               Duration in weeks: 2x/week, 8 weeks  Plan of care beginning date: 24  Plan of care expiration date: 25  PN #1 due:  25    Precautions: Cognitive, HTN, Type II DM      Subjective: \"I don't really have interests now\".     Objective: See treatment below. OT session focusing on immediate/delayed recall, temporal awareness, schedules, routines improving physical/mental stimuli to improve safety and overall QOL.           Thera Act:  Educated pt and daughter on importance of daily routines, schedules and mental stimuli to decrease dx progression and improve QOL.  Disc routines, daily schedules and sr centers with pt and daughter, (Sr ctr resources provided).  With input from pt and daughter a daily schedule was created for the afternoon to include lunch time and time in day for attending to puzzles, word search and homework sent home from OT.      Session concluded with engagement in worksheet focusing on \"opposites\".  Initially, pt was unable to verbalize appropriate words, however with encouragement and time at task, pt able to recall more \"opposite\" words w/increased confident in task evident.     Assessment: Tolerated treatment well. Pts daughter stated she was going to check out some of the Sr ctr encouraging pts to attend a couple days a week for psycho-social engagement.    Patient would " benefit from continued skilled OT.    Plan: Continued skilled OT per POC

## 2024-12-26 ENCOUNTER — APPOINTMENT (OUTPATIENT)
Dept: OCCUPATIONAL THERAPY | Facility: CLINIC | Age: 79
End: 2024-12-26
Payer: MEDICARE

## 2025-01-02 ENCOUNTER — APPOINTMENT (OUTPATIENT)
Dept: SPEECH THERAPY | Facility: CLINIC | Age: 80
End: 2025-01-02
Payer: MEDICARE

## 2025-01-02 ENCOUNTER — APPOINTMENT (OUTPATIENT)
Dept: OCCUPATIONAL THERAPY | Facility: CLINIC | Age: 80
End: 2025-01-02
Payer: MEDICARE

## 2025-01-09 ENCOUNTER — OFFICE VISIT (OUTPATIENT)
Dept: SPEECH THERAPY | Facility: CLINIC | Age: 80
End: 2025-01-09
Payer: MEDICARE

## 2025-01-09 ENCOUNTER — EVALUATION (OUTPATIENT)
Dept: OCCUPATIONAL THERAPY | Facility: CLINIC | Age: 80
End: 2025-01-09
Payer: MEDICARE

## 2025-01-09 DIAGNOSIS — G30.1 MILD LATE ONSET ALZHEIMER'S DEMENTIA WITHOUT BEHAVIORAL DISTURBANCE, PSYCHOTIC DISTURBANCE, MOOD DISTURBANCE, OR ANXIETY (HCC): ICD-10-CM

## 2025-01-09 DIAGNOSIS — F02.A0 MILD LATE ONSET ALZHEIMER'S DEMENTIA WITHOUT BEHAVIORAL DISTURBANCE, PSYCHOTIC DISTURBANCE, MOOD DISTURBANCE, OR ANXIETY (HCC): ICD-10-CM

## 2025-01-09 DIAGNOSIS — F02.A0 MILD LATE ONSET ALZHEIMER'S DEMENTIA WITHOUT BEHAVIORAL DISTURBANCE, PSYCHOTIC DISTURBANCE, MOOD DISTURBANCE, OR ANXIETY (HCC): Primary | ICD-10-CM

## 2025-01-09 DIAGNOSIS — R47.89 WORD FINDING DIFFICULTY: ICD-10-CM

## 2025-01-09 DIAGNOSIS — R41.841 COGNITIVE COMMUNICATION DEFICIT: Primary | ICD-10-CM

## 2025-01-09 DIAGNOSIS — G30.1 MILD LATE ONSET ALZHEIMER'S DEMENTIA WITHOUT BEHAVIORAL DISTURBANCE, PSYCHOTIC DISTURBANCE, MOOD DISTURBANCE, OR ANXIETY (HCC): Primary | ICD-10-CM

## 2025-01-09 PROCEDURE — 97530 THERAPEUTIC ACTIVITIES: CPT

## 2025-01-09 PROCEDURE — 92507 TX SP LANG VOICE COMM INDIV: CPT

## 2025-01-09 NOTE — PROGRESS NOTES
Daily Speech Treatment Note/DISCHARGE    Today's date: 2025  Patient’s name: Chelsea Bowles  : 1945  MRN: [id number]  Safety measures: Alzheimer's  Referring provider: Jose Huber,*      Encounter Diagnosis     ICD-10-CM    1. Cognitive communication deficit  R41.841       2. Word finding difficulty  R47.89       3. Mild late onset Alzheimer's dementia without behavioral disturbance, psychotic disturbance, mood disturbance, or anxiety ([facility])  G30.1     F02.A0         Visit Tracking:  POC   Expires Auth Expiration Date ST Visit Limit   2025 BOMN          Visit/Unit Tracking:  Auth Status Date 2024   BOMN Used 1 2 3    Remaining 9 8 7       Subjective/Behavioral:  -Patient, Patient's daughter, and Therapist had a discussion at the start of session today regarding POC moving forward.  Although Therapist and Patient's daughter know the benefit that therapy could have for Patient's word finding and voice, Patient is not comfortable to continue at this time.  Patient's anxiety increases prior to sessions and does not sleep well.  It was agreed upon by all parties to discontinue therapy at this time.    Objective/Assessment:  -Patient's family member/caregiver was present during today's session.      Short Term Goals  1. Patient will utilize word finding strategies during semantic feature analysis treatment activity for improved naming and verbal expression skills, to be achieved in 3 months.     2. Patient will complete word generation tasks (e.g., analogies, category matrices, etc.) with 80% accuracy using word finding strategies to facilitate improved word retrieval skills, to be achieved in 3 months.    3. Patient will be educated on vocal hygiene and demonstrate understanding of recommendations to facilitate increased quality of voice, to be achieved in 3 months.    Basic vocal hygiene information was reviewed with Patient during session today.   "Patient was also given vocal hygiene handout to review at home.     4. Patient will be educated on diaphragmatic breathing (versus clavicular breathing) to establish controlled, rhythmic breathing, to be achieved in 3 months.    Patient was educated on diaphragmatic breathing and the benefits when it comes to voicing.  Patient attempted diaphragmatic breaths, but demonstrated difficulty with just abdominal movements.  It was suggested that Patient try this exercise when laying down at home.     5. Patient will participate in a full voice evaluation to further assess vocal disorder, to be achieved in 1 month.    Although a full voice evaluation was not completed today, multiple voice exercises/techniques were reviewed with Patient.  A \"crash course\" was given in the follow exercises:     -Patient was introduced to the concept of SOVTE.  She was shown an educational video explaining the physics behind this task.  She then practiced SOVTE (with cup and water as well as straw only).  She was able to produce a hum for a few seconds and could transfer some of her hum to the straw, but was quickly lost.  Patient was also given a kazoo to take home to try as well.  Handouts were provided as well as YouTube channels to use when practicing independently.     -Patient practiced nasal resonance using /m/ sounds combined with vowels.  Patient was not able to find full voicing during today's session, but was given handouts to review and practice at home.     -Patient was introduced to the concept of oral resonance.  Therapist described how our voice is held in vowel sounds.  The more you can hold out a vowel, the more time the voice will have to resonate.  There was not enough time for Patient to practice this concept during today's session.     Plan:  -Patient was provided with home exercises/activities to target goals in plan of care at the end of today's session.  -Discharge.   "

## 2025-01-09 NOTE — PROGRESS NOTES
OCCUPATIONAL THERAPY PROGRESS NOTE / DISCHARGE    1/9/2025  Chelsea Bowles  1945  [id number]  Jose Huber,*   Diagnosis ICD-10-CM Associated Orders   1. Mild late onset Alzheimer's dementia without behavioral disturbance, psychotic disturbance, mood disturbance, or anxiety (HCC)  G30.1     F02.A0               Assessment/Plan    SKILLED ANALYSIS:  Pt, Chelsea, is a 79 y.o. female referred to Occupational Therapy s/p Mild late onset Alzheimer's dementia without behavioral disturbance, psychotic disturbance, mood disturbance, or anxiety (HCC) [G30.1, F02.A0].   Pt participated in skilled OT PN #1 following ~1 mo of OT. Pt has had 3 OT sessions since her evaluation.  She reports now doing puzzles at home and continues to report a lot of walking around her house.  She is also doing the word search books.  Pt overall scored an 8/30 on the SLUMS test today with deficits in all areas.  At this time. Pt/family is requesting today be her last session 2/2 pt feeling more comfortable at home to complete her cognitive tasks. Pt also reports feeling anxious about therapy sessions.  I provided pt with several worksheets that focused on attention, memory, visual skills. These included color by numbers, number searches, visual scanning sheets, word formation, and I-spy sheets.  D/C pt from OT and if anything changes or pt wishes to return, will need to obtain a new OT script for evaluation.  OT D/C.       POC expires Unit limit Auth Expiration date PT/OT/ST + Visit Limit?   2/6/2025 Neuro 6 OT/PT No auth B[facility]                           Visit/Unit Tracking  AUTH Status:  Date 12/6 12/12 12/18 12/20 1/9         N/A Used 1 2 3 4 5          Remaining  7 6 5 4 TBD              Duration in weeks: 2x/week, 8 weeks  Plan of care beginning date: 12/6/24  Plan of care expiration date: 2/6/25  PN #1 due:  1/6/25    Precautions: Cognitive, HTN, Type II DM      GOALS    Short Term Goals (4 weeks):  - Pt / daughter will demo  "good understanding and carryover of external memory strategies into everyday life to maintain QOL and independence = PARTIALLY MET  - Pt will maintain attention to task for 25+ minutes in multimodal environment demo improved focus and sustained attention skills = PARTIALLY MET  - Pt will improve verbal direction following of 1-2 steps demo improved processing and retention of information = PARTIALLY MET  - Facilitate strengthening/endurance training tolerating x 20 min for cognitive health and wellness = NOT MET      Long Term Goals (8 weeks):   - Pt will improve understanding of using cell phone functions (games, apps, logging in, making phone calls) for improved safety and indep with IADLs = PARTIALLY MET  - Patient and/or family will demo G understanding and use of memory book to inc pt's overall engagement in life roles and to dec frustrations with STM/delayed memory loss (as needed) = NOT MET  - Pt will be independent with cognitive activities and HEP at home with completion of at least 1 task per day to improve daily habits and routines = GOAL MET  - Pt will improve B/L  strength by 2-5 lbs through exercises, functional strengthening activities for improved indep with ADLs = DNT    *Additional goals to be added with completion of cognitive based testing      Subjective    PATIENT GOAL:  Cognitive function.       HISTORY OF PRESENT ILLNESS:     Pt is a 79 y.o. female who was referred to Occupational Therapy s/p  Mild late onset Alzheimer's dementia without behavioral disturbance, psychotic disturbance, mood disturbance, or anxiety ([facility]) [G30.1, F02.A0]    Pt reports having memory changes over the last several years, although today reports it seems better.      A lot difficulties with dates. Trouble using the Iphone and TV remote at home.     She writes down her appointments.  Recently did a 100 piece puzzle. Has some word finding books.     Seeing Dr. Huber from [facility] ...   \"Memory loss:  -MoCA today " " with deficits and visuospatial, executive function, draw clock, attention, language, delayed recall and orientation  - Dementia, likely Alzheimer's type , mild (FAST stage IV)\"     Has a life Alert system, has not worn yet.    Feels her speech is soft. Some word finding difficulty, losing train of thought and mid sentence losing what she wants to say.    Possibly some weakness.     She lives in a 2 story house, room is on the first floor.  She is performing her ADLs independently. Her medications are organized in a pill box container (AM/PM) and was recently managed herself but now her daughter puts her medications in a cup that she has to take.  Doesn't do many financials, daughter does it.  Does not use the stove.  Not much she needs to do during the day.     Every other Tuesday she goes to dinner with her friends. Stopped driving 2-3 years ago.  Walks a lot.     Denies any vision changes. Wears glasses for distance.          PMH:   Past Medical History:   Diagnosis Date    Diabetes mellitus (HCC)     Hypertension     Major depressive disorder, single episode, mild (HCC) 4/10/2023       Past Surgical Hx:   Past Surgical History:   Procedure Laterality Date    CHOLECYSTECTOMY      WRIST FRACTURE SURGERY Right           Pain Levels  Restin    With Activity:  0        Objective    IMPAIRMENTS SECTION      MOTOR     ROM BUE's all WFL  Near full ROM in shoulders    MMT  RUE 4-/5   LUE 4-/5    Dynamometer  R hand - 25 lbs .. Low   L hand - 30 lbs  .. Low             COGNITIVE CHECKLIST:  *Patient indicated that she is experiencing the following difficulties:  Memory: Remembering what people have told you and Remembering the date/day of the week    Attention: Losing your train of thought    Processing: Following directions    Communication: Word finding in conversation and Expressing thoughts and ideas fluently            Trail making Part A and Part B: TMT looks at visual search, scanning, speed of " processing, mental flexibility, and executive functions   Part A: 1 min 53 seconds with 0 cues, needed repeat of directions   Part B: 3.5 min to complete 1-A up to 4-D with maximal cues for task directions, alternating, and problem solving  Indicating deficits: Part A deficit > 50 seconds and Part B deficit > 130 seconds for age related norms          SLUMS EXAMINATION   What day of the week is it?  0/1  What is the year?  1  What state are we in?     Immediate recall of 5 objects:   (total not counted)  Math problem  how much did you spend?  0  How much do you have left?  1  Naming animals in 1 minute: 1/3   Recall of 5 objects:    Repeat of numbers/Attention: 0  Drawing of clock/time:    Vision/Recognition:    Story Recall/Auditory processin/8     TOTAL:             Motor-Free Visual Perception Test (4th Edition):  Is an individually administered assessment of visual-perceptual skills commonly used in everyday activities.  -  DNT          PLANNED THERAPY INTERVENTIONS:  Internal and external memory aides  Multimatrix for saccades/ visual clutter/attention  Multi-modal environment  Sustained/alternating/divided attention  Temporal Awareness: Organize the Hour activities  Memory and mental manipulation  Auditory processing with immediate recall  Memory retention with immediate and delayed recall  Edu on cog/vision apps  BITS - memory  Blaze pods - alternating attention

## 2025-01-19 DIAGNOSIS — R73.01 IFG (IMPAIRED FASTING GLUCOSE): ICD-10-CM

## 2025-01-20 RX ORDER — METFORMIN HYDROCHLORIDE 500 MG/1
500 TABLET, EXTENDED RELEASE ORAL 2 TIMES DAILY
Qty: 180 TABLET | Refills: 1 | Status: SHIPPED | OUTPATIENT
Start: 2025-01-20

## 2025-01-24 DIAGNOSIS — I10 BENIGN ESSENTIAL HTN: ICD-10-CM

## 2025-01-24 RX ORDER — AMLODIPINE BESYLATE 2.5 MG/1
TABLET ORAL
Qty: 100 TABLET | Refills: 0 | OUTPATIENT
Start: 2025-01-24

## 2025-03-04 ENCOUNTER — NURSE TRIAGE (OUTPATIENT)
Age: 80
End: 2025-03-04

## 2025-03-04 DIAGNOSIS — R41.0 CONFUSION: Primary | ICD-10-CM

## 2025-03-04 NOTE — TELEPHONE ENCOUNTER
FOLLOW UP: Please f/u with daughter Blanca with provider response for possible order for UA/C+S    REASON FOR CONVERSATION: No Triage Call    SYMPTOMS: as per MYC message increased confusion, daughter wants to rule out UTI     OTHER: visiting other daughter (change in environment) as per MYC message     DISPOSITION: No Contact Call  Reason for Disposition   Second attempt to contact caller AND no contact made. Phone number verified.    Protocols used: No Contact or Duplicate Contact Call-Adult-[place]

## 2025-03-04 NOTE — TELEPHONE ENCOUNTER
Regarding: ? UTI  ----- Message from Eloina LOJA sent at 3/4/2025  2:51 PM EST -----    Hi! Hope you are well. My mom (yuliana arauz 10/21[birth year]45) is visiting my sister and is more confused than usual. I think it is just the change of environment and not sleeping well but was wondering if you would consider ordering a ua with reflex to culture just to make sure we're not missing anything. I'm trying to avoid her having to go to urgent care and getting more upset. Feel free to call if you have any questions.  Thanks!  Blanca  [phone]

## 2025-03-13 ENCOUNTER — OFFICE VISIT (OUTPATIENT)
Dept: NEUROLOGY | Facility: CLINIC | Age: 80
End: 2025-03-13
Payer: MEDICARE

## 2025-03-13 VITALS
DIASTOLIC BLOOD PRESSURE: 60 MMHG | BODY MASS INDEX: 27.88 KG/M2 | SYSTOLIC BLOOD PRESSURE: 130 MMHG | HEART RATE: 75 BPM | HEIGHT: 60 IN | WEIGHT: 142 LBS

## 2025-03-13 DIAGNOSIS — R41.3 MEMORY LOSS: ICD-10-CM

## 2025-03-13 DIAGNOSIS — F32.0 MAJOR DEPRESSIVE DISORDER, SINGLE EPISODE, MILD (HCC): ICD-10-CM

## 2025-03-13 DIAGNOSIS — F02.A0 MILD LATE ONSET ALZHEIMER'S DEMENTIA WITHOUT BEHAVIORAL DISTURBANCE, PSYCHOTIC DISTURBANCE, MOOD DISTURBANCE, OR ANXIETY (HCC): Primary | ICD-10-CM

## 2025-03-13 DIAGNOSIS — R26.9 GAIT ABNORMALITY: ICD-10-CM

## 2025-03-13 DIAGNOSIS — G30.1 MILD LATE ONSET ALZHEIMER'S DEMENTIA WITHOUT BEHAVIORAL DISTURBANCE, PSYCHOTIC DISTURBANCE, MOOD DISTURBANCE, OR ANXIETY (HCC): Primary | ICD-10-CM

## 2025-03-13 PROCEDURE — 99214 OFFICE O/P EST MOD 30 MIN: CPT | Performed by: PSYCHIATRY & NEUROLOGY

## 2025-03-13 RX ORDER — SERTRALINE HYDROCHLORIDE 25 MG/1
25 TABLET, FILM COATED ORAL DAILY
Qty: 30 TABLET | Refills: 3 | Status: SHIPPED | OUTPATIENT
Start: 2025-03-13

## 2025-03-13 RX ORDER — DONEPEZIL HYDROCHLORIDE 5 MG/1
5 TABLET, FILM COATED ORAL EVERY MORNING
Qty: 30 TABLET | Refills: 3 | Status: SHIPPED | OUTPATIENT
Start: 2025-03-13

## 2025-03-13 RX ORDER — DONEPEZIL HYDROCHLORIDE 5 MG/1
5 TABLET, FILM COATED ORAL EVERY MORNING
Qty: 30 TABLET | Refills: 1 | Status: SHIPPED | OUTPATIENT
Start: 2025-03-13 | End: 2025-03-13

## 2025-03-13 NOTE — PROGRESS NOTES
"Name: Chelsea Bowles      : 1945      MRN: [id number]  Encounter Provider: Samir Ray MD  Encounter Date: 3/13/2025   Encounter department: [facility]  :  Assessment & Plan  Memory loss  Patient presents with 5 years Hx of progressive memory loss  Over the past 3-5 years patient has ceased driving, moved in with her daughter, Blanca, and no longer manages her finances or cooks food.  Patient had MoCA in  via [facility], scored 12 at that time  Recent B12 462, Folate >22.3, TSH 4.135  Patient has received MRI Brain 3-4 years ago, however it is not on file and was not a Neuroquant  Over the past 2-3 months patient's daughters, Blanca and Naye, have noticed increased word-finding difficulties, they believe that she is also having difficult recalling names as she calls everyone \"Sweetie\"  Patient still manages majority of ADLs, wakes in the morning, cleans and dresses herself, prepares simple meals. Her daughter and son-in-law, whom she currently resides with     Plan:  Start Aricept 5 mg every morning  MRI Brain Neuroquant  Restart Zoloft as below, wait 1 week after starting aricept  Follow-up in 3-4 months  Can consider escalating Aricept dose and initiating prn for nighttime agitation at that time  Orders:    Ambulatory Referral to Neurology    MRI brain NeuroQuant wo contrast; Future    donepezil (ARICEPT) 5 mg tablet; Take 1 tablet (5 mg total) by mouth every morning    Gait abnormality    Orders:    Ambulatory Referral to Neurology    Major depressive disorder, single episode, mild (HCC)  Patient had been taking Zoloft since 2024 due to concerns for anxiety and depression   Dose was increased to 50 mg daily in 2024 due to concerns for decreased efficacy  Soon after starting the increased dose, patient had a period of GI upset and was tapered down by family. She also stopped the supplements she had been taking, a multivitamin and fish oil.  During exam, " "patient was noted to have periods of anxiety, particularly during MoCA.     Plan:  Recommend restarting Zoloft at 25 mg   Instructed patient and daughters to initiate Donepezil for 1 week to monitor for changes, then can restart zoloft  Orders:    sertraline (Zoloft) 25 mg tablet; Take 1 tablet (25 mg total) by mouth daily      There are no Patient Instructions on file for this visit.     History of Present Illness   HPI   79-year-old male with history of dementia presents to clinic to establish due to concerns for memory.  Has history of worsening memory, during that time has these drive independently, and moved in with her daughter Blanca and her .  Patient is still responsible for her basic ADLs, waking up in the morning cleaning herself dressing herself, however she does not divide her daily medications, manage finances, or cook.  Today patient is often alone at home as both her daughter and her son-in-law work throughout the day, during this time she generally will make meals for herself and watch TV, there are puzzles in mind games at home however patient does not frequently use these.  Daughter has arranged for home health aide to come by the few hours a few times throughout the week to spend time with her, and encouraged her to or active such as going outside and doing some of these puzzles.  Recently over the past 2 to 3 months patient has had increased word finding difficulty per daughters, also likely having difficulty recalling names as patient is calling everybody \"sweetie\".  There is also a week long period in which the patient was staying with her other daughter, Naye, during that week long.  Patient was noted to be more confused and agitated, including 1 episode where patient was attempting to get dressed and leave the house very early in the morning without telling anybody.  Daughters noted that the niece's house is more close confines and less open concept so it is more easy to get lost or " confused.  Otherwise patient denies any falls, recent illnesses, tremor, headache, vision changes, hearing changes.  Review of Systems   Constitutional:  Negative for appetite change, fatigue and fever.   HENT: Negative.  Negative for hearing loss, tinnitus, trouble swallowing and voice change.    Eyes: Negative.  Negative for photophobia, pain and visual disturbance.   Respiratory: Negative.  Negative for shortness of breath.    Cardiovascular: Negative.  Negative for palpitations.   Gastrointestinal: Negative.  Negative for nausea and vomiting.   Endocrine: Negative.  Negative for cold intolerance.   Genitourinary: Negative.  Negative for dysuria, frequency and urgency.   Musculoskeletal:  Negative for back pain, gait problem, myalgias, neck pain and neck stiffness.   Skin: Negative.  Negative for rash.   Allergic/Immunologic: Negative.    Neurological:  Negative for dizziness, tremors, seizures, syncope, facial asymmetry, speech difficulty, weakness, light-headedness, numbness and headaches.   Hematological: Negative.  Does not bruise/bleed easily.   Psychiatric/Behavioral:  Positive for behavioral problems and confusion. Negative for hallucinations and sleep disturbance.     I have personally reviewed the MA's review of systems and made changes as necessary.    Pertinent Medical History            Past Medical History   Past Medical History:   Diagnosis Date    Diabetes mellitus (HCC)     Hypertension     Major depressive disorder, single episode, mild (HCC) 4/10/2023     Past Surgical History:   Procedure Laterality Date    CHOLECYSTECTOMY      WRIST FRACTURE SURGERY Right 1982     Family History   Problem Relation Age of Onset    Heart disease Mother     Hypertension Mother     Diabetes Mother     Heart disease Father     Cervical cancer Sister       reports that she has never smoked. She has never used smokeless tobacco. She reports current alcohol use of about 1.0 standard drink of alcohol per week. She  reports that she does not use drugs.  Current Outpatient Medications   Medication Instructions    amLODIPine (NORVASC) 5 mg tablet take 1 tablet by mouth one time daily. take in addition to the 2.5mg tablet for total daily does:7.5mg    atorvastatin (LIPITOR) 10 mg, Oral, Daily    docusate sodium (COLACE) 100 mg, 2 times daily    donepezil (ARICEPT) 5 mg, Oral, Every morning    fish oil 1,000 mg, Daily    glucose blood (OneTouch Ultra) test strip Check blood sugars once daily. Please substitute with appropriate alternative as covered by patient's insurance. Dx: E11.65    losartan (COZAAR) 50 mg, Oral, 2 times daily    metFORMIN (GLUCOPHAGE-XR) 500 mg, Oral, 2 times daily    multivitamin (THERAGRAN) TABS 1 tablet, Daily    OneTouch Delica Lancets 33G MISC Check blood sugars once daily. Please substitute with appropriate alternative as covered by patient's insurance. Dx: E11.65    sertraline (ZOLOFT) 25 mg, Oral, Daily   No Known Allergies   Current Outpatient Medications on File Prior to Visit   Medication Sig Dispense Refill    amLODIPine (NORVASC) 5 mg tablet take 1 tablet by mouth one time daily. take in addition to the 2.5mg tablet for total daily does:7.5mg 90 tablet 1    atorvastatin (LIPITOR) 10 mg tablet Take 1 tablet (10 mg total) by mouth daily 90 tablet 2    docusate sodium (COLACE) 100 mg capsule Take 100 mg by mouth 2 (two) times a day      losartan (COZAAR) 50 mg tablet TAKE ONE TABLET BY MOUTH TWICE DAILY 180 tablet 1    metFORMIN (GLUCOPHAGE-XR) 500 mg 24 hr tablet TAKE ONE TABLET BY MOUTH TWICE DAILY 180 tablet 1    glucose blood (OneTouch Ultra) test strip Check blood sugars once daily. Please substitute with appropriate alternative as covered by patient's insurance. Dx: E11.65 (Patient not taking: Reported on 3/13/2025) 100 each 3    multivitamin (THERAGRAN) TABS Take 1 tablet by mouth daily (Patient not taking: Reported on 3/13/2025)      Omega-3 Fatty Acids (fish oil) 1,000 mg Take 1,000 mg by  mouth daily (Patient not taking: Reported on 3/13/2025)      OneTouch Delica Lancets 33G MISC Check blood sugars once daily. Please substitute with appropriate alternative as covered by patient's insurance. Dx: E11.65 (Patient not taking: Reported on 3/13/2025) 100 each 3    [DISCONTINUED] sertraline (ZOLOFT) 50 mg tablet Take 1 tablet (50 mg total) by mouth daily (Patient not taking: Reported on 3/13/2025) 90 tablet 3     No current facility-administered medications on file prior to visit.      Social History     Tobacco Use    Smoking status: Never    Smokeless tobacco: Never   Vaping Use    Vaping status: Never Used   Substance and Sexual Activity    Alcohol use: Yes     Alcohol/week: 1.0 standard drink of alcohol     Types: 1 Glasses of wine per week     Comment: 1 - 2 per week     Drug use: Never    Sexual activity: Not Currently        Objective   /60 (BP Location: Left arm, Patient Position: Sitting, Cuff Size: Adult)   Pulse 75   Ht 5' (1.524 m)   Wt 64.4 kg (142 lb)   BMI 27.73 kg/m²     Physical Exam  Constitutional:       Appearance: Normal appearance.   HENT:      Head: Normocephalic and atraumatic.      Mouth/Throat:      Mouth: Mucous membranes are moist.      Pharynx: Oropharynx is clear.   Eyes:      General: Lids are normal.      Extraocular Movements: Extraocular movements intact.      Conjunctiva/sclera: Conjunctivae normal.      Pupils: Pupils are equal, round, and reactive to light.   Cardiovascular:      Rate and Rhythm: Normal rate.      Pulses: Normal pulses.   Pulmonary:      Effort: Pulmonary effort is normal.   Musculoskeletal:         General: No swelling or signs of injury. Normal range of motion.      Cervical back: Normal range of motion. No rigidity.      Right lower leg: No edema.      Left lower leg: No edema.   Neurological:      Mental Status: She is alert and oriented to person, place, and time.      Cranial Nerves: No cranial nerve deficit.      Sensory: No sensory  "deficit.      Motor: No weakness.      Coordination: Coordination normal.      Gait: Gait normal.      Deep Tendon Reflexes: Reflexes normal.      Reflex Scores:       Bicep reflexes are 2+ on the right side and 2+ on the left side.       Brachioradialis reflexes are 2+ on the right side and 2+ on the left side.       Patellar reflexes are 1+ on the right side and 1+ on the left side.  Psychiatric:         Mood and Affect: Mood normal.         Behavior: Behavior normal.       Neurological Exam  Mental Status  Alert. Oriented only to person, place and time. When asked reason of visit: \"My daughters brought me in\". Oriented to person, place, and time. Language is fluent with no aphasia.    Cranial Nerves  CN II: Visual acuity is normal. Visual fields full to confrontation.  CN III, IV, VI: Extraocular movements intact bilaterally. Normal lids and orbits bilaterally. Pupils equal round and reactive to light bilaterally.  CN V: Facial sensation is normal.  CN VII: Full and symmetric facial movement.  CN VIII: Hearing is normal.  CN IX, X: Palate elevates symmetrically. Normal gag reflex.  CN XI: Shoulder shrug strength is normal.  CN XII: Tongue midline without atrophy or fasciculations.    Motor  Normal muscle bulk throughout. Normal muscle tone.                                               Right                     Left   Shoulder abduction               5                          5  Elbow flexion                         5                          5  Elbow extension                    5                          5  Hip flexion                              5                          5  Plantarflexion                         5                          5  Dorsiflexion                            5                          5    Sensory  Light touch is normal in upper and lower extremities.     Reflexes                                            Right                      Left  Brachioradialis                    2+          "                2+  Biceps                                 2+                         2+  Patellar                                1+                         1+    Coordination  Right: Finger-to-nose normal.Left: Finger-to-nose normal.    Gait   Normal gait.Casual gait is normal including stance, stride, and arm swing.      Radiology Results Review : No pertinent imaging studies reviewed.

## 2025-03-14 ENCOUNTER — TELEPHONE (OUTPATIENT)
Dept: FAMILY MEDICINE CLINIC | Facility: CLINIC | Age: 80
End: 2025-03-14

## 2025-03-14 NOTE — TELEPHONE ENCOUNTER
Provider: Dr Le  Name of form: la  Form placed: Dr Le's Clinical Folder  Form to be faxed (fax #), mailed (address), or picked up (by whom):fax when completed and call daughter to advise(Blanca) [phone]  Patient was made aware of the 7-10 business day form policy

## 2025-03-18 NOTE — TELEPHONE ENCOUNTER
Forms completed and faxed. Blanca also made aware. She wanted to  forms and will do so on 3/18/25. Will be at the  in the drawer.

## 2025-04-04 DIAGNOSIS — I10 BENIGN ESSENTIAL HTN: ICD-10-CM

## 2025-04-04 RX ORDER — AMLODIPINE BESYLATE 5 MG/1
TABLET ORAL
Qty: 90 TABLET | Refills: 1 | Status: SHIPPED | OUTPATIENT
Start: 2025-04-04

## 2025-04-07 ENCOUNTER — APPOINTMENT (OUTPATIENT)
Dept: LAB | Age: 80
End: 2025-04-07
Payer: MEDICARE

## 2025-04-07 ENCOUNTER — TELEPHONE (OUTPATIENT)
Age: 80
End: 2025-04-07

## 2025-04-07 ENCOUNTER — OFFICE VISIT (OUTPATIENT)
Dept: URGENT CARE | Age: 80
End: 2025-04-07
Payer: MEDICARE

## 2025-04-07 VITALS
DIASTOLIC BLOOD PRESSURE: 80 MMHG | RESPIRATION RATE: 18 BRPM | SYSTOLIC BLOOD PRESSURE: 171 MMHG | HEART RATE: 68 BPM | TEMPERATURE: 97.3 F | OXYGEN SATURATION: 97 %

## 2025-04-07 DIAGNOSIS — E78.00 HYPERCHOLESTEROLEMIA: ICD-10-CM

## 2025-04-07 DIAGNOSIS — H53.8 BLURRY VISION, BILATERAL: ICD-10-CM

## 2025-04-07 DIAGNOSIS — E03.9 ACQUIRED HYPOTHYROIDISM: ICD-10-CM

## 2025-04-07 DIAGNOSIS — N18.31 TYPE 2 DIABETES MELLITUS WITH STAGE 3A CHRONIC KIDNEY DISEASE, WITHOUT LONG-TERM CURRENT USE OF INSULIN (HCC): ICD-10-CM

## 2025-04-07 DIAGNOSIS — R42 DIZZY: Primary | ICD-10-CM

## 2025-04-07 DIAGNOSIS — E11.22 TYPE 2 DIABETES MELLITUS WITH STAGE 3A CHRONIC KIDNEY DISEASE, WITHOUT LONG-TERM CURRENT USE OF INSULIN (HCC): ICD-10-CM

## 2025-04-07 DIAGNOSIS — R41.0 CONFUSION: ICD-10-CM

## 2025-04-07 DIAGNOSIS — R82.90 ABNORMAL URINE FINDINGS: ICD-10-CM

## 2025-04-07 LAB
ALBUMIN SERPL BCG-MCNC: 4.3 G/DL (ref 3.5–5)
ALP SERPL-CCNC: 57 U/L (ref 34–104)
ALT SERPL W P-5'-P-CCNC: 18 U/L (ref 7–52)
ANION GAP SERPL CALCULATED.3IONS-SCNC: 9 MMOL/L (ref 4–13)
AST SERPL W P-5'-P-CCNC: 19 U/L (ref 13–39)
BILIRUB SERPL-MCNC: 0.46 MG/DL (ref 0.2–1)
BUN SERPL-MCNC: 10 MG/DL (ref 5–25)
CALCIUM SERPL-MCNC: 9.6 MG/DL (ref 8.4–10.2)
CHLORIDE SERPL-SCNC: 98 MMOL/L (ref 96–108)
CHOLEST SERPL-MCNC: 140 MG/DL (ref ?–200)
CO2 SERPL-SCNC: 29 MMOL/L (ref 21–32)
CREAT SERPL-MCNC: 0.76 MG/DL (ref 0.6–1.3)
EST. AVERAGE GLUCOSE BLD GHB EST-MCNC: 134 MG/DL
GFR SERPL CREATININE-BSD FRML MDRD: 74 ML/MIN/1.73SQ M
GLUCOSE P FAST SERPL-MCNC: 105 MG/DL (ref 65–99)
GLUCOSE SERPL-MCNC: 129 MG/DL (ref 65–140)
HBA1C MFR BLD: 6.3 %
HDLC SERPL-MCNC: 77 MG/DL
LDLC SERPL CALC-MCNC: 47 MG/DL (ref 0–100)
NONHDLC SERPL-MCNC: 63 MG/DL
POTASSIUM SERPL-SCNC: 3.8 MMOL/L (ref 3.5–5.3)
PROT SERPL-MCNC: 7.5 G/DL (ref 6.4–8.4)
SARS-COV-2 AG UPPER RESP QL IA: NEGATIVE
SL AMB  POCT GLUCOSE, UA: ABNORMAL
SL AMB LEUKOCYTE ESTERASE,UA: ABNORMAL
SL AMB POCT BILIRUBIN,UA: ABNORMAL
SL AMB POCT BLOOD,UA: ABNORMAL
SL AMB POCT CLARITY,UA: ABNORMAL
SL AMB POCT COLOR,UA: YELLOW
SL AMB POCT KETONES,UA: ABNORMAL
SL AMB POCT NITRITE,UA: ABNORMAL
SL AMB POCT PH,UA: 5
SL AMB POCT SPECIFIC GRAVITY,UA: 1.01
SL AMB POCT URINE PROTEIN: ABNORMAL
SL AMB POCT UROBILINOGEN: 0.2
SODIUM SERPL-SCNC: 136 MMOL/L (ref 135–147)
TRIGL SERPL-MCNC: 78 MG/DL (ref ?–150)
TSH SERPL DL<=0.05 MIU/L-ACNC: 1.9 UIU/ML (ref 0.45–4.5)
VALID CONTROL: NORMAL

## 2025-04-07 PROCEDURE — 80053 COMPREHEN METABOLIC PANEL: CPT

## 2025-04-07 PROCEDURE — 80061 LIPID PANEL: CPT

## 2025-04-07 PROCEDURE — 84443 ASSAY THYROID STIM HORMONE: CPT

## 2025-04-07 PROCEDURE — 83036 HEMOGLOBIN GLYCOSYLATED A1C: CPT

## 2025-04-07 PROCEDURE — G0463 HOSPITAL OUTPT CLINIC VISIT: HCPCS | Performed by: STUDENT IN AN ORGANIZED HEALTH CARE EDUCATION/TRAINING PROGRAM

## 2025-04-07 PROCEDURE — 82948 REAGENT STRIP/BLOOD GLUCOSE: CPT

## 2025-04-07 PROCEDURE — 87811 SARS-COV-2 COVID19 W/OPTIC: CPT | Performed by: STUDENT IN AN ORGANIZED HEALTH CARE EDUCATION/TRAINING PROGRAM

## 2025-04-07 PROCEDURE — 36415 COLL VENOUS BLD VENIPUNCTURE: CPT

## 2025-04-07 PROCEDURE — 87086 URINE CULTURE/COLONY COUNT: CPT | Performed by: STUDENT IN AN ORGANIZED HEALTH CARE EDUCATION/TRAINING PROGRAM

## 2025-04-07 PROCEDURE — 81002 URINALYSIS NONAUTO W/O SCOPE: CPT | Performed by: STUDENT IN AN ORGANIZED HEALTH CARE EDUCATION/TRAINING PROGRAM

## 2025-04-07 PROCEDURE — 99214 OFFICE O/P EST MOD 30 MIN: CPT | Performed by: STUDENT IN AN ORGANIZED HEALTH CARE EDUCATION/TRAINING PROGRAM

## 2025-04-07 NOTE — PROGRESS NOTES
"  [facility]        NAME: Chelsea Bowles is a 79 y.o. female  : 1945    MRN: [id number]  DATE: 2025  TIME: 9:45 AM    Assessment and Plan   Dizzy [R42]  1. Dizzy  Poct Covid 19 Rapid Antigen Test    POCT urine dip      2. Blurry vision, bilateral        No focal deficits, she overall feels a vague \"off\" sensation as well as dizzy and blurry vision.  Blood glucose 129  Rapid COVID negative  Urine dip with trace blood, moderate leukocytes, negative nitrites.  She is not currently having any urinary symptoms.  Will send out for culture, hold off on antibiotics.  Her family will monitor her closely.  They plan to get her blood work ordered by PCP done today, to follow-up with PCP.  If any severe worsening symptoms, to ER.      Patient Instructions   Your COVID test was negative today, your blood glucose level was in the normal range, your urine did not show any definite urinary tract infection.  Will send out your urine for culture, physical back showing a urinary tract infection, we will call you.  You may call us with any questions.  Please follow-up with PCP for recheck.  If you develop any severe worsening symptoms, please go to the ER or call 911.    Follow up with PCP in 3-5 days.  Proceed to  ER if symptoms worsen.    If tests have been performed at [facility], our office will contact you with results if changes need to be made to the care plan discussed with you at the visit.  [facility] on [facility]    Chief Complaint     Chief Complaint   Patient presents with    Dizziness     Some nausea    Blurred Vision         History of Present Illness       Patient presents with her daughter who assists with history.  This morning she woke up at 6 AM and was on the stairs and complained of \"not feeling right\", feeling dizzy, and feeling that her vision was blurred.  The blurred sensation is in both eyes.  Due to cognitive decline, it is difficult for her to " specifically describe her symptoms.  She denies all other symptoms including congestion, cough, sore throat, weakness, numbness, diarrhea, constipation, urinary symptoms.  No palpitations, chest pain, shortness of breath.  She felt chilly but she states that this is at her baseline as she always feels chilly.  She has been on Aricept for the last 1 month as well as sertraline, no more recent changes.  Her family members are medical providers and well aware of her baseline, they feel that the symptoms she is experiencing are not her usual, but no focal deficits on their exams.  When she is for started to complain of her symptoms, her daughter tried to check her blood sugar but the battery had , she gave her some orange juice.  Patient has not taken her regular medications this morning yet including her blood pressure medicine.  She does not typically have issues with UTI.        Review of Systems   Review of Systems   All other systems reviewed and are negative.        Current Medications       Current Outpatient Medications:     amLODIPine (NORVASC) 5 mg tablet, take 1 tablet by mouth one time daily. take in addition to the 2.5mg tablet for total daily does:7.5mg, Disp: 90 tablet, Rfl: 1    atorvastatin (LIPITOR) 10 mg tablet, Take 1 tablet (10 mg total) by mouth daily, Disp: 90 tablet, Rfl: 2    docusate sodium (COLACE) 100 mg capsule, Take 100 mg by mouth 2 (two) times a day, Disp: , Rfl:     donepezil (ARICEPT) 5 mg tablet, Take 1 tablet (5 mg total) by mouth every morning, Disp: 30 tablet, Rfl: 3    glucose blood (OneTouch Ultra) test strip, Check blood sugars once daily. Please substitute with appropriate alternative as covered by patient's insurance. Dx: E11.65, Disp: 100 each, Rfl: 3    losartan (COZAAR) 50 mg tablet, TAKE ONE TABLET BY MOUTH TWICE DAILY, Disp: 180 tablet, Rfl: 1    metFORMIN (GLUCOPHAGE-XR) 500 mg 24 hr tablet, TAKE ONE TABLET BY MOUTH TWICE DAILY, Disp: 180 tablet, Rfl: 1    OneTouch  Delica Lancets 33G MISC, Check blood sugars once daily. Please substitute with appropriate alternative as covered by patient's insurance. Dx: E11.65, Disp: 100 each, Rfl: 3    sertraline (Zoloft) 25 mg tablet, Take 1 tablet (25 mg total) by mouth daily, Disp: 30 tablet, Rfl: 3    multivitamin (THERAGRAN) TABS, Take 1 tablet by mouth daily (Patient not taking: Reported on 4/7/2025), Disp: , Rfl:     Omega-3 Fatty Acids (fish oil) 1,000 mg, Take 1,000 mg by mouth daily (Patient not taking: Reported on 4/7/2025), Disp: , Rfl:     Current Allergies     Allergies as of 04/07/2025    (No Known Allergies)            The following portions of the patient's history were reviewed and updated as appropriate: allergies, current medications, past family history, past medical history, past social history, past surgical history and problem list.     Past Medical History:   Diagnosis Date    Diabetes mellitus (HCC)     Hypertension     Major depressive disorder, single episode, mild (HCC) 4/10/2023       Past Surgical History:   Procedure Laterality Date    CHOLECYSTECTOMY      WRIST FRACTURE SURGERY Right 1982       Family History   Problem Relation Age of Onset    Heart disease Mother     Hypertension Mother     Diabetes Mother     Heart disease Father     Cervical cancer Sister          Medications have been verified.        Objective   BP (!) 171/80   Pulse 68   Temp (!) 97.3 °F (36.3 °C)   Resp 18   SpO2 97%   No LMP recorded.       Physical Exam     Physical Exam  Vitals and nursing note reviewed.   Constitutional:       General: She is not in acute distress.     Appearance: Normal appearance. She is not ill-appearing or toxic-appearing.   HENT:      Head: Normocephalic and atraumatic.      Right Ear: Ear canal and external ear normal.      Left Ear: Ear canal and external ear normal.      Ears:      Comments: Bilateral dull TM with clear effusions     Nose: Nose normal. No congestion or rhinorrhea.      Mouth/Throat:       Mouth: Mucous membranes are moist.      Pharynx: No oropharyngeal exudate or posterior oropharyngeal erythema.   Eyes:      Extraocular Movements: Extraocular movements intact.   Cardiovascular:      Rate and Rhythm: Normal rate and regular rhythm.      Heart sounds: Normal heart sounds.   Pulmonary:      Effort: Pulmonary effort is normal. No respiratory distress.      Breath sounds: Normal breath sounds. No stridor. No wheezing, rhonchi or rales.   Skin:     General: Skin is warm and dry.      Capillary Refill: Capillary refill takes less than 2 seconds.      Findings: No rash.   Neurological:      General: No focal deficit present.      Mental Status: She is alert.      Cranial Nerves: No cranial nerve deficit.      Motor: No weakness.      Gait: Gait normal.      Comments: Interactive, answering questions appropriately, difficult for her to provide details of her symptoms   Psychiatric:         Behavior: Behavior normal.

## 2025-04-07 NOTE — PATIENT INSTRUCTIONS
Your COVID test was negative today, your blood glucose level was in the normal range, your urine did not show any definite urinary tract infection.  Will send out your urine for culture, physical back showing a urinary tract infection, we will call you.  You may call us with any questions.  Please follow-up with PCP for recheck.  If you develop any severe worsening symptoms, please go to the ER or call 911.

## 2025-04-07 NOTE — TELEPHONE ENCOUNTER
The patient's daughter called to report that she took the patient today to the ER because she was not feeling well. The lab of Lipid panel and Comprehensive metabolic panel have the date to be done on 05/14/2025. The patient's daughter would like the date to be change for today to check her sodium levels.     They will waiting for a call back when the labs date have change to check in the patient to have it done today.     Please Advice and contact

## 2025-04-08 ENCOUNTER — RESULTS FOLLOW-UP (OUTPATIENT)
Dept: FAMILY MEDICINE CLINIC | Facility: CLINIC | Age: 80
End: 2025-04-08

## 2025-04-08 LAB — BACTERIA UR CULT: NORMAL

## 2025-04-08 NOTE — RESULT ENCOUNTER NOTE
Please ask her daughter the other questions I asked. Be sure she is feeling better and ask if there are any urinary symptoms

## 2025-04-10 ENCOUNTER — PATIENT MESSAGE (OUTPATIENT)
Dept: FAMILY MEDICINE CLINIC | Facility: CLINIC | Age: 80
End: 2025-04-10

## 2025-04-10 DIAGNOSIS — F03.911 AGITATION DUE TO DEMENTIA (HCC): Primary | ICD-10-CM

## 2025-04-11 RX ORDER — QUETIAPINE FUMARATE 25 MG/1
25 TABLET, FILM COATED ORAL
Qty: 30 TABLET | Refills: 2 | Status: SHIPPED | OUTPATIENT
Start: 2025-04-11

## 2025-04-14 NOTE — TELEPHONE ENCOUNTER
Daughter called back and states pt is doing okay. No c/o urinary sx. Blanca believes this is just disease progression and that this is the new normal for pt. They are making some adjustments at home and going from there. Blanca states she may need some additional FMLA due to missing work to care for her mother. Told her to let us know. Pt sees  on 4/28 and has a f/u here on 6/3. Blanca will call us with any problems or concerns in the interim.

## 2025-04-28 ENCOUNTER — OFFICE VISIT (OUTPATIENT)
Age: 80
End: 2025-04-28
Payer: MEDICARE

## 2025-04-28 VITALS
SYSTOLIC BLOOD PRESSURE: 132 MMHG | TEMPERATURE: 97.7 F | OXYGEN SATURATION: 99 % | HEIGHT: 61 IN | HEART RATE: 67 BPM | WEIGHT: 147.8 LBS | DIASTOLIC BLOOD PRESSURE: 58 MMHG | BODY MASS INDEX: 27.9 KG/M2

## 2025-04-28 DIAGNOSIS — G30.1 MILD LATE ONSET ALZHEIMER'S DEMENTIA WITHOUT BEHAVIORAL DISTURBANCE, PSYCHOTIC DISTURBANCE, MOOD DISTURBANCE, OR ANXIETY (HCC): Primary | ICD-10-CM

## 2025-04-28 DIAGNOSIS — F02.A0 MILD LATE ONSET ALZHEIMER'S DEMENTIA WITHOUT BEHAVIORAL DISTURBANCE, PSYCHOTIC DISTURBANCE, MOOD DISTURBANCE, OR ANXIETY (HCC): Primary | ICD-10-CM

## 2025-04-28 DIAGNOSIS — R26.2 AMBULATORY DYSFUNCTION: ICD-10-CM

## 2025-04-28 DIAGNOSIS — N18.31 TYPE 2 DIABETES MELLITUS WITH STAGE 3A CHRONIC KIDNEY DISEASE, WITHOUT LONG-TERM CURRENT USE OF INSULIN (HCC): ICD-10-CM

## 2025-04-28 DIAGNOSIS — E78.00 HYPERCHOLESTEROLEMIA: ICD-10-CM

## 2025-04-28 DIAGNOSIS — I10 BENIGN ESSENTIAL HTN: ICD-10-CM

## 2025-04-28 DIAGNOSIS — Z71.89 ADVANCED CARE PLANNING/COUNSELING DISCUSSION: ICD-10-CM

## 2025-04-28 DIAGNOSIS — E11.22 TYPE 2 DIABETES MELLITUS WITH STAGE 3A CHRONIC KIDNEY DISEASE, WITHOUT LONG-TERM CURRENT USE OF INSULIN (HCC): ICD-10-CM

## 2025-04-28 DIAGNOSIS — E03.9 ACQUIRED HYPOTHYROIDISM: ICD-10-CM

## 2025-04-28 DIAGNOSIS — F33.9 DEPRESSION, RECURRENT (HCC): ICD-10-CM

## 2025-04-28 PROCEDURE — 99483 ASSMT & CARE PLN PT COG IMP: CPT | Performed by: INTERNAL MEDICINE

## 2025-04-28 NOTE — PROGRESS NOTES
[facility]  Follow-up/cognitive care plan  [id number] [facility] [id number] [phone]    NAME: Chelsea Bowles  AGE: 79 y.o. SEX: female    DATE OF ENCOUNTER: 4/28/2025    Assessment and Plan     1) Dementia  - MoCA today 12/30 (was 12/30 on 6/10/2024) with deficits in visual-spatial, executive function, draw clock, language, abstraction, delayed recall and orientation  -Staging: Mild to moderate, likely Alzheimer type (FAST stage IV)  -Patient advised to remain active physically, mentally and socially  -Maintain current conditions under control and continue following with PCP  -Currently on Seroquel 25 mg at bedtime, tolerated  -Off Aricept  -Medications Review: Medications seem appropriate for present conditions. Check with PCP before using over the counter medications. Avoid over the counter medications that can affect cognition (e.g., Benadryl, Tylenol PM). Avoid NSAIDs due to risk of GI bleed and renal impairment.   -Decision-making capacity: Likely limited for complex medical or financial decisions.  Could consider neuropsychology assessment for capacity if needed.  -Caregiver Review: Patient needs assistance with some ADLs and dependent with IADLs.  Patient lives with her daughter and reports being content with current caregiving situation.   -Driving Safety: Patient does not drive, continue driving cessation   -ACP Review: Patient has a POA  -Follow-up in 6 months and repeat MoCA    2) Ambulatory dysfunction  -TUG 15 seconds  -Patient is high fall risk  -Fall precautions  -Encourage physical activity    3) Hypertension  -BP stable  -Currently on amlodipine 5 mg daily and losartan 50 mg daily  -Continue to follow with PCP    4) Hyperlipidemia  -Recent lipid profile reviewed  -Currently on atorvastatin 10 mg daily, tolerated  -Managed by PCP    5) Hypothyroidism  -TSH 1.89  -Continue following with PCP    6) Type 2 diabetes mellitus  -A1c 6.3  -Currently on metformin 500 mg  daily  -Continue following with PCP    7) Depression  - GDS 4/15  -Patient reports good mood and denies suicidal ideation  -Encourage physical activity and socialization  -Currently on Seroquel 25 mg at bedtime  -Continue following with PCP    8) ACP review  -Patient has a POA      Chief Complaint     Chief Complaint   Patient presents with    Follow-up     W/MoCA accompanied by her daughter blanca       History of Present Illness       I had the pleasure of evaluating  Chelsea Bowles who is a 79 y.o. female  in Geriatric follow-up today. Since our last follow-up has not had any falls, has not had any hospitalizations and has had medication changes or major life events.   04/07/2025 had urgent care visits and got UA w/ moderate leukocyte esterase was on cipro for 2-3 days and then culture was negative        Memory issues were first noticed byfamily. They have been present since 6 year(s), and include  short term memory loss. Symptoms mainly affect short term memory loss, and have worsened.      Chelsea Bowles reports  does not  drive, does  get lost in familiar settings when visiting other daughter in the setting of scenery change, and has not had recent citations or accidents. She does not manage her own affairs such as balancing  checkbook, paying bills, and managing investments and does  have any difficulties with handling these financial affairs. She does  notice changes in her own mood or personality and has been treated in the past for depression but had GI issues so stopped it. She does not note any hearing or vision issues and does not report any sleep issues. They does  have a living will and does  have an appointed POA.     Family members accompanying the patient today include (Blanca, daughter).  She reports the patients behaviors include the following: Family Notes Behaviors requiring a lot of prompts. She is no longer driving. She gets very fixated on her phone and how much battery life, possible  OCD tendencies     Denies any hallucinations     Functional Assessment  Activities of Daily Living (ADLs) Instrumental Activities of Daily Living (IADLs)   - Bodily functions/toileting: independent  - Hygiene: independent  - Grooming: independent  - Dressing: independent although has some abnormal choices/layers  - Feeding: independent but outside of breakfast would sometimes requiring some cues from family  - Transfer: independent - Simple chores: independent  - Cooking: requires assistance  - Shopping: requires assistance  - Medication management: requires assistance  - Personal finances: unable to complete/dependent  - Driving: unable to complete/dependent       She saw neurology Dr. Naren Lyle and Dr. Ray in 03/13/2025 and recommended getting an MRI but declined. She had one dosage of the aricept and did not feel well on it and was quite anxious and was visibly shaky and was quite nervous.     She is not on aricept and not on sertraline (had quite a bit of GI upset). Daughter, Blanca, has monitors and systems in place to keep her safe. They had tried home care in the past and was anxious. She also sleeps moreso during the day and thought that home care were people that she needed to entertain. Home care previously got there in 12-3pm but it seemed to make it worse.     She used to go out with her friends every Tuesday but stopped and opted out and past few months/since new year. She seems content with her schedule. She can't use the remote so watches channel 6     1 year ago she was walking regularly. She has trouble with anything new. She is able to use her old iPhone to call her daughter so daughter would prefer to keep old iPhone. Even with new things such she has trouble such as things as suing the microwave.     Other daughter, is a teacher far away, might be able to help. She typically only showers when daughter and son in law are in home.     There have been very rare outbursts and usually in  "setting of scenery. She visited another daughter where she had an outbursts. She is still quite a bit anxious and seems to be aware and seems to perpetuate     Overall, she seems to be doing well on the seroquel 25mg qhs and has not had any issues with evening outbursts since then.     She is most comfortable with daughter and son in law but managing fine by herself.   Other daughter will be coming in May to take care of her as Blanca will be out of town and other daughter will be visiting current home to not change at this time.     The following portions of the patient's history were reviewed and updated as appropriate: allergies, current medications, past family history, past medical history, past social history, past surgical history and problem list.      Review of Systems     Review of Systems   Constitutional:  Negative for chills and fever.   HENT:  Negative for ear pain and sore throat.    Eyes:  Negative for pain and visual disturbance.   Respiratory:  Negative for cough and shortness of breath.    Cardiovascular:  Negative for chest pain and palpitations.   Gastrointestinal:  Negative for abdominal pain and vomiting.   Genitourinary:  Negative for dysuria and hematuria.   Musculoskeletal:  Negative for arthralgias and back pain.   Skin:  Negative for color change and rash.   Neurological:  Negative for seizures and syncope.   All other systems reviewed and are negative.    Objective     /58 (BP Location: Left arm, Patient Position: Sitting, Cuff Size: Standard)   Pulse 67   Temp 97.7 °F (36.5 °C) (Temporal)   Ht 5' 1\" (1.549 m)   Wt 67 kg (147 lb 12.8 oz)   SpO2 99%   BMI 27.93 kg/m²     Physical Exam  Vitals and nursing note reviewed.   HENT:      Head: Normocephalic.      Nose: Nose normal.      Mouth/Throat:      Mouth: Mucous membranes are moist.   Eyes:      General: Lids are normal.      Extraocular Movements: Extraocular movements intact.      Conjunctiva/sclera: Conjunctivae normal.     "  Pupils: Pupils are equal, round, and reactive to light.   Cardiovascular:      Rate and Rhythm: Normal rate and regular rhythm.      Pulses: Normal pulses.      Heart sounds: No murmur heard.  Pulmonary:      Effort: Pulmonary effort is normal.      Breath sounds: Normal breath sounds.   Abdominal:      General: Abdomen is flat. Bowel sounds are normal. There is no distension.      Palpations: Abdomen is soft.      Tenderness: There is no abdominal tenderness.   Musculoskeletal:      Cervical back: Normal range of motion and neck supple. No rigidity or tenderness.   Skin:     General: Skin is warm.   Neurological:      Deep Tendon Reflexes:      Reflex Scores:       Tricep reflexes are 2+ on the right side and 2+ on the left side.       Bicep reflexes are 2+ on the right side and 2+ on the left side.       Brachioradialis reflexes are 2+ on the right side and 2+ on the left side.       Patellar reflexes are 1+ on the right side and 1+ on the left side.       Achilles reflexes are 1+ on the right side and 1+ on the left side.     Comments: Oriented to person   Got the wrong date and year      TUG 15 sec    Pertinent Laboratory/Diagnostic Studies:  I have personally reviewed lab results from 4/7/2025 including  CMP: , K3.8, BUN 10, creatinine 0.76, GFR 74  Lipid profile: Total cholesterol 140, HDL 77, LDL 47  TSH 1.89 and A1c 6.3

## 2025-05-06 DIAGNOSIS — R73.01 IFG (IMPAIRED FASTING GLUCOSE): ICD-10-CM

## 2025-05-06 DIAGNOSIS — I10 BENIGN ESSENTIAL HTN: ICD-10-CM

## 2025-05-07 RX ORDER — LOSARTAN POTASSIUM 50 MG/1
50 TABLET ORAL 2 TIMES DAILY
Qty: 180 TABLET | Refills: 0 | Status: SHIPPED | OUTPATIENT
Start: 2025-05-07

## 2025-05-23 NOTE — PROGRESS NOTES
[facility]  Consultation  [address], [address]  [place] [id number] [phone]      NAME: Chelsea Bolwes  AGE: 78 y.o. SEX: female    DATE OF ENCOUNTER: 6/10/2024    Assessment and Plan   Memory Difficulties: Diagnosis - Dementia, Mild  Patient with 5 years of slowly progressive memory loss  Suspect Alzheimer vs Vascular dementia given patient's vascular risk factors    MoCa 12/30 performed today  GDS 5  Driving not a safety concern at this time as patient does not drive  Will hold off on starting medication like Aricept or Donepezil   Supplied patient and family with resources for social engagement  Will follow up in October    Major Depressive Disorder  GDS 5 in office  Will start low dose Zoloft 25 mg QD    Ambulatory Dysfunction  TUG 15 seconds today  Will order home PT    Diabetes Mellitus Type 2  Last hemoglobin 6.5  On metformin   Continue to follow with PCP to address    Hypertension  History of HTN  On Amlodipine and Losartan for BP optimization  Continue to address with PCP    Hyperlipidemia   Continue home Atorvastatin  LDL 49 (4/17/2024)    Hypothyroidism  Last TSH 5.059  Will defer to PCP      Orders Placed This Encounter   Procedures    EXTERNAL Referral to Home Health     Chief Complaint     Chief Complaint   Patient presents with    Geriatric Evaluation     New Patient      History of Present Illness     HPI    We had the pleasure of evaluating Chelsea Bowles who is a 78 y.o. female  in Geriatric consultation today. She presents today alongside her daughter Blanca today in the office. Patient with gradual loss of short term memory loss over the course of the last 5-6 years observed by both family and patient. Currently she lives with daughter and son in law. She currently requires assistance in the follow ADLs/IADLs.  Functional Assessment  Activities of Daily Living (ADLs) Instrumental Activities of Daily Living (IADLs)   - Bodily functions/toileting: independent  -  Hygiene: independent  - Grooming: independent  - Dressing: independent  - Feeding: independent  - Transfer: independent - Simple chores: independent  - Cooking: requires assistance  - Shopping: requires assistance  - Medication management: requires assistance  - Personal finances: unable to complete/dependent  - Driving: unable to complete/dependent     Chelsea Bowles reports that she currently does not drive. Family does note change in her mood especially since her  past away in 2018. She dopes spend a lot of time homer by herself when family goes off work. No issues with hearing or visual loss. No issues with sleep.     The following portions of the patient's history were reviewed and updated as appropriate: allergies, current medications, past family history, past medical history, past social history, past surgical history and problem list.    Review of Systems     Review of Systems   Constitutional:  Negative for chills, fatigue, fever and unexpected weight change.   HENT:  Negative for drooling, hearing loss, sinus pressure, sinus pain, tinnitus, trouble swallowing and voice change.    Eyes:  Negative for photophobia and visual disturbance.   Respiratory:  Negative for chest tightness and shortness of breath.    Cardiovascular:  Negative for chest pain, palpitations and leg swelling.   Gastrointestinal:  Negative for constipation, diarrhea and nausea.   Endocrine: Negative for cold intolerance.   Genitourinary:  Negative for difficulty urinating.   Musculoskeletal:  Negative for arthralgias, back pain, gait problem, myalgias, neck pain and neck stiffness.   Skin:  Negative for pallor and rash.   Neurological:  Negative for dizziness, tremors, seizures, syncope, facial asymmetry, speech difficulty, weakness, light-headedness, numbness and headaches.   Psychiatric/Behavioral:  Positive for confusion, decreased concentration and dysphoric mood. Negative for behavioral problems and sleep disturbance.           Objective     /68 (BP Location: Left arm, Patient Position: Sitting, Cuff Size: Standard)   Pulse 67   Temp 97.6 °F (36.4 °C) (Temporal)   Ht 5' (1.524 m)   Wt 68 kg (150 lb)   SpO2 98%   BMI 29.29 kg/m²     Physical Exam  Vitals reviewed.   Constitutional:       General: She is not in acute distress.     Appearance: She is not toxic-appearing.   HENT:      Head: Normocephalic and atraumatic.      Right Ear: External ear normal.      Left Ear: External ear normal.   Eyes:      Extraocular Movements: Extraocular movements intact.      Conjunctiva/sclera: Conjunctivae normal.      Pupils: Pupils are equal, round, and reactive to light.   Cardiovascular:      Rate and Rhythm: Normal rate.   Pulmonary:      Effort: Pulmonary effort is normal.   Musculoskeletal:         General: No tenderness. Normal range of motion.      Cervical back: No rigidity or tenderness.   Skin:     General: Skin is warm.      Coloration: Skin is not jaundiced.   Neurological:      General: No focal deficit present.      Mental Status: She is alert.   Psychiatric:         Mood and Affect: Mood normal.         Behavior: Behavior normal.           Current Medications     Current Outpatient Medications:     amLODIPine (NORVASC) 5 mg tablet, take 1 tablet by mouth one time daily. take in addition to the 2.5mg tablet for total daily does:7.5mg, Disp: 90 tablet, Rfl: 1    atorvastatin (LIPITOR) 20 mg tablet, TAKE ONE TABLET BY MOUTH ONE TIME DAILY, Disp: 90 tablet, Rfl: 0    docusate sodium (COLACE) 100 mg capsule, Take 100 mg by mouth 2 (two) times a day, Disp: , Rfl:     glucose blood (OneTouch Ultra) test strip, Check blood sugars once daily. Please substitute with appropriate alternative as covered by patient's insurance. Dx: E11.65, Disp: 100 each, Rfl: 3    losartan (COZAAR) 50 mg tablet, TAKE ONE TABLET BY MOUTH TWICE DAILY, Disp: 180 tablet, Rfl: 1    metFORMIN (GLUCOPHAGE-XR) 500 mg 24 hr tablet, TAKE ONE TABLET BY MOUTH  TWICE DAILY, Disp: 180 tablet, Rfl: 1    multivitamin (THERAGRAN) TABS, Take 1 tablet by mouth daily, Disp: , Rfl:     Omega-3 Fatty Acids (fish oil) 1,000 mg, Take 1,000 mg by mouth daily, Disp: , Rfl:     OneTouch Delica Lancets 33G MISC, Check blood sugars once daily. Please substitute with appropriate alternative as covered by patient's insurance. Dx: E11.65, Disp: 100 each, Rfl: 3    Zoloft 25 MG tablet, Take 1 tablet (25 mg total) by mouth daily, Disp: 90 tablet, Rfl: 2    amLODIPine (NORVASC) 2.5 mg tablet, Take 1 tablet (2.5 mg total) by mouth daily 5 + 2.5 for a total of 7.5 mg (Patient not taking: Reported on 6/10/2024), Disp: 90 tablet, Rfl: 1    Name: Chelsea Bowles  : 1945  MRN: [id number]  DOS: 6/10/2024    Diagnoses:   Diagnosis ICD-10-CM Associated Orders   1. Memory loss  R41.3 Ambulatory Referral to Senior Care     Zoloft 25 MG tablet     EXTERNAL Referral to Home Health      2. Gait abnormality  R26.9 Ambulatory Referral to Senior Care         Oriented - self; Oriented - place; Oriented - time

## 2025-05-28 ENCOUNTER — RA CDI HCC (OUTPATIENT)
Dept: OTHER | Facility: HOSPITAL | Age: 80
End: 2025-05-28

## 2025-06-03 ENCOUNTER — OFFICE VISIT (OUTPATIENT)
Dept: FAMILY MEDICINE CLINIC | Facility: CLINIC | Age: 80
End: 2025-06-03
Payer: MEDICARE

## 2025-06-03 VITALS
HEIGHT: 61 IN | SYSTOLIC BLOOD PRESSURE: 148 MMHG | BODY MASS INDEX: 28.58 KG/M2 | DIASTOLIC BLOOD PRESSURE: 64 MMHG | WEIGHT: 151.4 LBS | OXYGEN SATURATION: 99 % | TEMPERATURE: 97.3 F | HEART RATE: 66 BPM

## 2025-06-03 DIAGNOSIS — E78.00 HYPERCHOLESTEROLEMIA: ICD-10-CM

## 2025-06-03 DIAGNOSIS — F02.A0 MILD LATE ONSET ALZHEIMER'S DEMENTIA WITHOUT BEHAVIORAL DISTURBANCE, PSYCHOTIC DISTURBANCE, MOOD DISTURBANCE, OR ANXIETY (HCC): ICD-10-CM

## 2025-06-03 DIAGNOSIS — E11.22 TYPE 2 DIABETES MELLITUS WITH STAGE 3A CHRONIC KIDNEY DISEASE, WITHOUT LONG-TERM CURRENT USE OF INSULIN (HCC): Primary | ICD-10-CM

## 2025-06-03 DIAGNOSIS — E03.9 ACQUIRED HYPOTHYROIDISM: ICD-10-CM

## 2025-06-03 DIAGNOSIS — G30.1 MILD LATE ONSET ALZHEIMER'S DEMENTIA WITHOUT BEHAVIORAL DISTURBANCE, PSYCHOTIC DISTURBANCE, MOOD DISTURBANCE, OR ANXIETY (HCC): ICD-10-CM

## 2025-06-03 DIAGNOSIS — N18.31 TYPE 2 DIABETES MELLITUS WITH STAGE 3A CHRONIC KIDNEY DISEASE, WITHOUT LONG-TERM CURRENT USE OF INSULIN (HCC): Primary | ICD-10-CM

## 2025-06-03 DIAGNOSIS — I10 BENIGN ESSENTIAL HTN: ICD-10-CM

## 2025-06-03 PROCEDURE — 99214 OFFICE O/P EST MOD 30 MIN: CPT | Performed by: FAMILY MEDICINE

## 2025-06-03 PROCEDURE — G2211 COMPLEX E/M VISIT ADD ON: HCPCS | Performed by: FAMILY MEDICINE

## 2025-06-03 RX ORDER — DOCUSATE SODIUM 100 MG/1
100 CAPSULE, LIQUID FILLED ORAL 2 TIMES DAILY PRN
Status: SHIPPED
Start: 2025-06-03

## 2025-06-03 NOTE — ASSESSMENT & PLAN NOTE
Well controlled - Norvasc recently lowered due to low blood pressure   On Cozaar 50 mg, Norvasc 5 mg

## 2025-06-03 NOTE — ASSESSMENT & PLAN NOTE
Well controlled on April blood work   Not checking home sugars, goals of care more comfort   Check labs as needed   Tolerating ARB and statin     Lab Results   Component Value Date    HGBA1C 6.3 (H) 04/07/2025       Orders:    Comprehensive metabolic panel; Future    Hemoglobin A1C; Future    CBC and differential; Future    TSH, 3rd generation; Future

## 2025-06-03 NOTE — ASSESSMENT & PLAN NOTE
Well controlled on last labs   Orders:    Comprehensive metabolic panel; Future    Hemoglobin A1C; Future    CBC and differential; Future    TSH, 3rd generation; Future

## 2025-06-03 NOTE — PROGRESS NOTES
Follow up visit   Name: Chelsea Bowles      : 1945      MRN: [id number]  Encounter Provider: Fabiola Le DO  Encounter Date: 6/3/2025   Encounter department: [facility]    :  Assessment & Plan  Type 2 diabetes mellitus with stage 3a chronic kidney disease, without long-term current use of insulin (HCC)  Well controlled on April blood work   Not checking home sugars, goals of care more comfort   Check labs as needed   Tolerating ARB and statin     Lab Results   Component Value Date    HGBA1C 6.3 (H) 2025       Orders:    Comprehensive metabolic panel; Future    Hemoglobin A1C; Future    CBC and differential; Future    TSH, 3rd generation; Future    Benign essential HTN  Well controlled - Norvasc recently lowered due to low blood pressure   On Cozaar 50 mg, Norvasc 5 mg        Hypercholesterolemia  Well controlled on recent blood work        Mild late onset Alzheimer's dementia without behavioral disturbance, psychotic disturbance, mood disturbance, or anxiety (HCC)  Addition of Seroquel 25 mg at bedtime helping sleep and patient's mood is good        Acquired hypothyroidism  Well controlled on last labs   Orders:    Comprehensive metabolic panel; Future    Hemoglobin A1C; Future    CBC and differential; Future    TSH, 3rd generation; Future      Assessment & Plan          Return in about 5 months (around 2025) for Medicare Wellness Visit.        Chelsea is a 79 y.o. female who presents today for follow up on chronic conditions.     Chief Complaint   Patient presents with    Follow-up     History of Present Illness     Concerns today:  Here today with her daughter   Patient states she is doing well - no concerns   Not checking blood pressure or blood sugar   Walks in the neighborhood   Goes to the stores   Like word searches   Sleeps well   Appetite okay   They are not checking blood pressure or blood sugar - goals more toward comfort. Only checking if she does  "not seem well.           Review of Systems  ROS:  all others negative per daughter - pt without concerns.    PHQ-2/9 Depression Screening                Objective   /64 (Patient Position: Sitting, Cuff Size: Standard)   Pulse 66   Temp (!) 97.3 °F (36.3 °C) (Temporal)   Ht 5' 1\" (1.549 m)   Wt 68.7 kg (151 lb 6.4 oz)   SpO2 99%   BMI 28.61 kg/m²     /64 (Patient Position: Sitting, Cuff Size: Standard)   Pulse 66   Temp (!) 97.3 °F (36.3 °C) (Temporal)   Ht 5' 1\" (1.549 m)   Wt 68.7 kg (151 lb 6.4 oz)   SpO2 99%   BMI 28.61 kg/m²     BP Readings from Last 3 Encounters:   06/03/25 148/64   04/28/25 132/58   04/07/25 (!) 171/80     Wt Readings from Last 3 Encounters:   06/03/25 68.7 kg (151 lb 6.4 oz)   04/28/25 67 kg (147 lb 12.8 oz)   03/13/25 64.4 kg (142 lb)       Physical Exam  Constitutional: she appears well-developed and well-nourished.   HENT: Head: Normocephalic.   Right Ear: External ear normal. Tympanic membrane normal.   Left Ear: External ear normal. Tympanic membrane normal.   Nose: Nose normal. No mucosal edema, No rhinorrhea.   Right sinus exhibits no maxillary sinus tenderness.   Left sinus exhibits no maxillary sinus tenderness.   Mouth/Throat: Oropharynx is clear and moist.   Eyes: Normal conjunctiva.  No erythema. No discharge.  Neck: No pain on exam. Neck supple.   Cardiovascular: Normal rate, regular rhythm and normal heart sounds.    Pulmonary/Chest: Effort normal and breath sounds normal. No wheezes. No rales. No rhonchi.   Abdominal: Soft. Bowel sounds are normal. There is no tenderness.   Musculoskeletal: she exhibits no edema.   Lymphadenopathy: she has no cervical adenopathy.   Neurological: she  is alert.   Skin: Skin is warm and dry. No rashes.  Psychiatric: she  has a normal mood and affect. her behavior is normal. Thought content normal.   Vitals reviewed.      Current Medications:  Current Medications[1]         [1]   Current Outpatient Medications   Medication " Sig Dispense Refill    amLODIPine (NORVASC) 5 mg tablet Take 1 tablet (5 mg total) by mouth daily      atorvastatin (LIPITOR) 10 mg tablet Take 1 tablet (10 mg total) by mouth daily 90 tablet 2    docusate sodium (COLACE) 100 mg capsule Take 1 capsule (100 mg total) by mouth 2 (two) times a day as needed for constipation      glucose blood (OneTouch Ultra) test strip Check blood sugars once daily. Please substitute with appropriate alternative as covered by patient's insurance. Dx: E11.65 100 each 3    losartan (COZAAR) 50 mg tablet TAKE ONE TABLET BY MOUTH TWICE DAILY 180 tablet 0    metFORMIN (GLUCOPHAGE-XR) 500 mg 24 hr tablet TAKE ONE TABLET BY MOUTH TWICE DAILY 180 tablet 1    OneTouch Delica Lancets 33G MISC Check blood sugars once daily. Please substitute with appropriate alternative as covered by patient's insurance. Dx: E11.65 (Patient taking differently: as needed Check blood sugars once daily. Please substitute with appropriate alternative as covered by patient's insurance. Dx: E11.65) 100 each 3    QUEtiapine (SEROquel) 25 mg tablet Take 1 tablet (25 mg total) by mouth daily at bedtime 30 tablet 2     No current facility-administered medications for this visit.

## 2025-06-07 RX ORDER — AMLODIPINE BESYLATE 5 MG/1
5 TABLET ORAL DAILY
COMMUNITY
Start: 2025-06-03

## 2025-06-12 ENCOUNTER — OFFICE VISIT (OUTPATIENT)
Dept: NEUROLOGY | Facility: CLINIC | Age: 80
End: 2025-06-12
Payer: MEDICARE

## 2025-06-12 VITALS
WEIGHT: 149 LBS | HEIGHT: 61 IN | OXYGEN SATURATION: 98 % | DIASTOLIC BLOOD PRESSURE: 64 MMHG | HEART RATE: 75 BPM | BODY MASS INDEX: 28.13 KG/M2 | SYSTOLIC BLOOD PRESSURE: 124 MMHG

## 2025-06-12 DIAGNOSIS — F02.A0 MILD LATE ONSET ALZHEIMER'S DEMENTIA WITHOUT BEHAVIORAL DISTURBANCE, PSYCHOTIC DISTURBANCE, MOOD DISTURBANCE, OR ANXIETY (HCC): Primary | ICD-10-CM

## 2025-06-12 DIAGNOSIS — G30.1 MILD LATE ONSET ALZHEIMER'S DEMENTIA WITHOUT BEHAVIORAL DISTURBANCE, PSYCHOTIC DISTURBANCE, MOOD DISTURBANCE, OR ANXIETY (HCC): Primary | ICD-10-CM

## 2025-06-12 PROCEDURE — 99214 OFFICE O/P EST MOD 30 MIN: CPT | Performed by: PSYCHIATRY & NEUROLOGY

## 2025-06-12 RX ORDER — MEMANTINE HYDROCHLORIDE 5 MG/1
5 TABLET ORAL 2 TIMES DAILY
Qty: 60 TABLET | Refills: 1 | Status: SHIPPED | OUTPATIENT
Start: 2025-06-12

## 2025-06-12 NOTE — ASSESSMENT & PLAN NOTE
"Patient presents for 3-month follow-up of Alzheimer's dementia with her daughter-in-law, Blanca  During previous visit patient had been started on sertraline 25 mg daily and Aricept 5 mg daily  To tolerate sertraline due to GI cramping that began soon after initiation  In 5/2025 patient had episode of confusion and acting \"off\", went to urgent care initially thinking this was UTI however this led Aricept being discontinued and the initiation of Seroquel by her PCP  Patient was briefly involved with the health group that came by during the afternoon while her son and daughter-in-law were at work, however discontinued the services after they are causing the patient more stress than benefit  MRI brain neuro quant ordered during previous visit was scheduled however discontinued due to concern that this would cause more stress to the patient and would likely not alter the course of treatment  Otherwise patient largely unchanged from last visit, still performs majority of ADLs and remains home alone throughout the day.  Will perform word searches and puzzles, but primarily watches TV.   Daughter-in-law does note that patient will occasionally have staring episodes or will forget a conversation midsentence, however not significant decrease in remembering previous conversations or names of family members.  No falls, illnesses, concerns for agitation at this time    Plan:  Start memantine 5 mg daily for 1 week, then increase to 5 mg twice daily  Can consider increasing to 10 mg twice daily or long-acting formulation if tolerating well  Follow-up in clinic in 3 to 4 months    Orders:    memantine (NAMENDA) 5 mg tablet; Take 1 tablet (5 mg total) by mouth in the morning and 1 tablet (5 mg total) in the evening.    "

## 2025-06-12 NOTE — PROGRESS NOTES
"Name: Chelsea Bowles      : 1945      MRN: [id number]  Encounter Provider: Samir Ray MD  Encounter Date: 2025   Encounter department: [facility]  :  Assessment & Plan  Mild late onset Alzheimer's dementia without behavioral disturbance, psychotic disturbance, mood disturbance, or anxiety (HCC)  Patient presents for 3-month follow-up of Alzheimer's dementia with her daughter-in-law, Blan[place]  During previous visit patient had been started on sertraline 25 mg daily and Aricept 5 mg daily  To tolerate sertraline due to GI cramping that began soon after initiation  In 2025 patient had episode of confusion and acting \"off\", went to urgent care initially thinking this was UTI however this led Aricept being discontinued and the initiation of Seroquel by her PCP  Patient was briefly involved with the health group that came by during the afternoon while her son and daughter-in-law were at work, however discontinued the services after they are causing the patient more stress than benefit  MRI brain neuro quant ordered during previous visit was scheduled however discontinued due to concern that this would cause more stress to the patient and would likely not alter the course of treatment  Otherwise patient largely unchanged from last visit, still performs majority of ADLs and remains home alone throughout the day.  Will perform word searches and puzzles, but primarily watches TV.   Daughter-in-law does note that patient will occasionally have staring episodes or will forget a conversation midsentence, however not significant decrease in remembering previous conversations or names of family members.  No falls, illnesses, concerns for agitation at this time    Plan:  Start memantine 5 mg daily for 1 week, then increase to 5 mg twice daily  Can consider increasing to 10 mg twice daily or long-acting formulation if tolerating well  Follow-up in clinic in 3 to 4 " "months    Orders:    memantine (NAMENDA) 5 mg tablet; Take 1 tablet (5 mg total) by mouth in the morning and 1 tablet (5 mg total) in the evening.      There are no Patient Instructions on file for this visit.     History of Present Illness   HPI   79 year old female presents to clinic for 3 month follow-up for Alzheimer's dementia. Following last visit, patient had been started on Sertraline and Aricept.  Sertraline caused GI upset and the patient and was discontinued.  Following episode in May 2025 when the patient was acting confused and \"off\" in urgent care patient was prescribed a brief course of ciprofloxacin Aricept was discontinued.  After discussion with patient's PCP she was started on Seroquel 25 mg at bedtime, which she has been tolerating well with a decrease in her night terrors/nightmares.  Patient gets 10-11 hours of sleep per night wakes up around 8 AM after her daughter-in-law and son have gone to work at home alone until 5 to 6 PM.  She performed word searches and occasionally puzzles, but primarily watches TV.  Home aides were briefly coming to the house during midday however patient fixated on these aides coming at a particular time or on a particular day which was causing more stress than benefit so the services were discontinued.  Otherwise patient remains responsible for primary ADLs, does not drive, uses pill organizer set up by daughter-in-law, and does basic cooking.  Denies any falls, recent illnesses, recent hospitalizations, increased agitation, instances of leaving the house or getting lost.    Review of Systems   Constitutional:  Negative for appetite change, fatigue and fever.   HENT: Negative.  Negative for hearing loss, tinnitus, trouble swallowing and voice change.    Eyes: Negative.  Negative for photophobia, pain and visual disturbance.   Respiratory: Negative.  Negative for shortness of breath.    Cardiovascular: Negative.  Negative for palpitations.   Gastrointestinal: " Negative.  Negative for nausea and vomiting.   Endocrine: Negative.  Negative for cold intolerance.   Genitourinary: Negative.  Negative for dysuria, frequency and urgency.   Musculoskeletal:  Negative for back pain, gait problem, myalgias, neck pain and neck stiffness.   Skin: Negative.  Negative for rash.   Allergic/Immunologic: Negative.    Neurological:  Negative for dizziness, tremors, seizures, syncope, facial asymmetry, speech difficulty, weakness, light-headedness, numbness and headaches.   Hematological: Negative.  Does not bruise/bleed easily.   Psychiatric/Behavioral:  Positive for confusion (reports memory is stable). Negative for hallucinations and sleep disturbance.     I have personally reviewed the MA's review of systems and made changes as necessary.    Medical History Reviewed by provider this encounter:     .  Medications Ordered Prior to Encounter[1]   Social History[2]     Objective   There were no vitals taken for this visit.    Physical Exam  Constitutional:       Appearance: Normal appearance.   HENT:      Head: Normocephalic and atraumatic.      Mouth/Throat:      Mouth: Mucous membranes are moist.      Pharynx: Oropharynx is clear.     Eyes:      Extraocular Movements: Extraocular movements intact.      Conjunctiva/sclera: Conjunctivae normal.      Pupils: Pupils are equal, round, and reactive to light.       Cardiovascular:      Rate and Rhythm: Normal rate.      Pulses: Normal pulses.   Pulmonary:      Effort: Pulmonary effort is normal.     Musculoskeletal:         General: No swelling or signs of injury. Normal range of motion.      Cervical back: Normal range of motion. No rigidity.      Right lower leg: No edema.      Left lower leg: No edema.     Neurological:      General: No focal deficit present.      Mental Status: She is alert.      Cranial Nerves: No cranial nerve deficit.      Sensory: No sensory deficit.      Motor: No weakness.      Coordination: Coordination normal.       Gait: Gait normal.      Deep Tendon Reflexes: Reflexes normal.      Reflex Scores:       Tricep reflexes are 2+ on the right side and 2+ on the left side.       Bicep reflexes are 2+ on the right side and 2+ on the left side.       Brachioradialis reflexes are 2+ on the right side and 2+ on the left side.       Patellar reflexes are 2+ on the right side and 2+ on the left side.     Comments: Oriented to person and place, not time   Psychiatric:         Mood and Affect: Mood normal.         Behavior: Behavior normal.       Neurological Exam  Mental Status  Alert. Oriented only to person and place. Language is fluent with no aphasia. Attention and concentration are normal.    Cranial Nerves  CN II: Visual acuity is normal. Visual fields full to confrontation.  CN III, IV, VI: Extraocular movements intact bilaterally. Pupils equal round and reactive to light bilaterally.  CN V: Facial sensation is normal.  CN VII: Full and symmetric facial movement.  CN VIII: Hearing is normal.  CN IX, X: Palate elevates symmetrically  CN XI: Shoulder shrug strength is normal.  CN XII: Tongue midline without atrophy or fasciculations.    Motor  Normal muscle bulk throughout. No fasciculations present. Normal muscle tone.                                               Right                     Left   Shoulder abduction               5                          5  Elbow flexion                         5                          5  Elbow extension                    5                          5  Hip flexion                              5                          5  Knee flexion                           5                          5  Knee extension                      5                          5  Plantarflexion                         5                          5  Dorsiflexion                            5                          5    Sensory  Light touch is normal in upper and lower extremities.     Reflexes                                             Right                      Left  Brachioradialis                    2+                         2+  Biceps                                 2+                         2+  Triceps                                2+                         2+  Patellar                                2+                         2+    Coordination  Right: Finger-to-nose normal. Rapid alternating movement normal. Heel-to-shin normal.Left: Finger-to-nose normal. Rapid alternating movement normal. Heel-to-shin normal.    Gait   Normal gait.Casual gait is normal including stance, stride, and arm swing.  Oriented to person and place, not time.      Radiology Results Review : No pertinent imaging studies reviewed.           [1]   Current Outpatient Medications on File Prior to Visit   Medication Sig Dispense Refill    amLODIPine (NORVASC) 5 mg tablet Take 1 tablet (5 mg total) by mouth daily      atorvastatin (LIPITOR) 10 mg tablet Take 1 tablet (10 mg total) by mouth daily 90 tablet 2    docusate sodium (COLACE) 100 mg capsule Take 1 capsule (100 mg total) by mouth 2 (two) times a day as needed for constipation (Patient taking differently: Take 100 mg by mouth in the morning and 100 mg before bedtime.)      glucose blood (OneTouch Ultra) test strip Check blood sugars once daily. Please substitute with appropriate alternative as covered by patient's insurance. Dx: E11.65 100 each 3    losartan (COZAAR) 50 mg tablet TAKE ONE TABLET BY MOUTH TWICE DAILY 180 tablet 0    metFORMIN (GLUCOPHAGE-XR) 500 mg 24 hr tablet TAKE ONE TABLET BY MOUTH TWICE DAILY 180 tablet 1    OneTouch Delica Lancets 33G MISC Check blood sugars once daily. Please substitute with appropriate alternative as covered by patient's insurance. Dx: E11.65 100 each 3    QUEtiapine (SEROquel) 25 mg tablet Take 1 tablet (25 mg total) by mouth daily at bedtime 30 tablet 2     No current facility-administered medications on file prior to visit.   [2]   Social History  Tobacco  Use    Smoking status: Never     Passive exposure: Never    Smokeless tobacco: Never   Vaping Use    Vaping status: Never Used   Substance and Sexual Activity    Alcohol use: Yes     Alcohol/week: 1.0 standard drink of alcohol     Types: 1 Cans of beer per week     Comment: Occasionally 1drink/week    Drug use: Never    Sexual activity: Not Currently

## 2025-06-30 DIAGNOSIS — F03.911 AGITATION DUE TO DEMENTIA (HCC): ICD-10-CM

## 2025-07-02 RX ORDER — QUETIAPINE FUMARATE 25 MG/1
25 TABLET, FILM COATED ORAL
Qty: 90 TABLET | Refills: 3 | Status: SHIPPED | OUTPATIENT
Start: 2025-07-02

## 2025-07-20 DIAGNOSIS — R73.01 IFG (IMPAIRED FASTING GLUCOSE): ICD-10-CM

## 2025-07-21 RX ORDER — METFORMIN HYDROCHLORIDE 500 MG/1
500 TABLET, EXTENDED RELEASE ORAL 2 TIMES DAILY
Qty: 180 TABLET | Refills: 1 | Status: SHIPPED | OUTPATIENT
Start: 2025-07-21

## 2025-07-30 NOTE — PROGRESS NOTES
July 30, 2025      Re: Jann SUAREZ Walter  [address] [facility] [address]    To Whom It May Concern,    This is letter is to certify that Jann Walter has been under my care and seen in our clinic today, 7/30/2025.            Korina Fu MD  [facility]  [address] [facility] [address]  Phone: [phone]   Name: Chelsea Bowles      : 1945      MRN: [id number]  Encounter Provider: Fabiola Le DO  Encounter Date: 2024   Encounter department: [facility]    Assessment & Plan  Encounter for Medicare annual wellness exam         Benign essential HTN         Type 2 diabetes mellitus with stage 3a chronic kidney disease, without long-term current use of insulin (HCC)    Lab Results   Component Value Date    HGBA1C 6.2 (H) 10/22/2024       Orders:    Comprehensive metabolic panel; Future    Lipid panel; Future    Hemoglobin A1C; Future    TSH, 3rd generation; Future    Hypercholesterolemia    Orders:    atorvastatin (LIPITOR) 10 mg tablet; Take 1 tablet (10 mg total) by mouth daily    Comprehensive metabolic panel; Future    Lipid panel; Future    Hemoglobin A1C; Future    TSH, 3rd generation; Future    Stage 2 chronic kidney disease  Lab Results   Component Value Date    EGFR 65 2024    EGFR 59 2023    EGFR 53 2023    CREATININE 0.83 10/22/2024    CREATININE 0.85 2024    CREATININE 0.93 2023            Acquired hypothyroidism    Orders:    Comprehensive metabolic panel; Future    Lipid panel; Future    Hemoglobin A1C; Future    TSH, 3rd generation; Future    Mild late onset Alzheimer's dementia without behavioral disturbance, psychotic disturbance, mood disturbance, or anxiety (HCC)         Depression, recurrent (HCC)           Counseling regarding advanced directives            Preventive health issues were discussed with patient, and age appropriate screening tests were ordered as noted in patient's After Visit Summary. Personalized health advice and appropriate referrals for health education or preventive services given if needed, as noted in patient's After Visit Summary.    History of Present Illness     HPI   Patient Care Team:  Fabiola Le DO as PCP - General (Family Medicine)  Jose Huber MD (Geriatric Medicine)    Review  of Systems  Medical History Reviewed by provider this encounter:  Tobacco  Allergies  Meds  Problems  Med Hx  Surg Hx  Fam Hx       Annual Wellness Visit Questionnaire   Chelsea is here for her Subsequent Wellness visit.     Health Risk Assessment:   Patient rates overall health as very good. Patient feels that their physical health rating is same. Patient is satisfied with their life. Eyesight was rated as slightly better. Hearing was rated as same. Patient feels that their emotional and mental health rating is same. Patients states they are never, rarely angry. Patient states they are sometimes unusually tired/fatigued. Pain experienced in the last 7 days has been none. Patient states that she has experienced no weight loss or gain in last 6 months.     Depression Screening:   PHQ-9 Score: 3      Fall Risk Screening:   In the past year, patient has experienced: no history of falling in past year      Urinary Incontinence Screening:   Patient has not leaked urine accidently in the last six months.     Home Safety:  Patient does not have trouble with stairs inside or outside of their home. Patient has working smoke alarms and has working carbon monoxide detector. Home safety hazards include: none.     Nutrition:   Current diet is Regular.     Medications:   Patient is currently taking over-the-counter supplements. OTC medications include: see medication list. Patient is able to manage medications.     Activities of Daily Living (ADLs)/Instrumental Activities of Daily Living (IADLs):   Walk and transfer into and out of bed and chair?: Yes  Dress and groom yourself?: Yes    Bathe or shower yourself?: Yes    Feed yourself? Yes  Do your laundry/housekeeping?: Yes  Manage your money, pay your bills and track your expenses?: Yes  Make your own meals?: Yes    Do your own shopping?: Yes    Previous Hospitalizations:   Any hospitalizations or ED visits within the last 12 months?: No      Advance Care Planning:   Living  will: Yes    Durable POA for healthcare: Yes    Advanced directive: Yes    Advanced directive counseling given: Yes    End of Life Decisions reviewed with patient: Yes      Comments: Wishes reviewed with patient and her daughter.  She wishes to be a DNR and both of her daughters to be medical power of  if needed.    Cognitive Screening:   Provider or family/friend/caregiver concerned regarding cognition?: No    PREVENTIVE SCREENINGS      Cardiovascular Screening:    General: Screening Not Indicated and History Lipid Disorder      Diabetes Screening:     General: Screening Not Indicated and History Diabetes      Colorectal Cancer Screening:     General: Screening Not Indicated      Breast Cancer Screening:     General: Screening Not Indicated      Cervical Cancer Screening:    General: Screening Not Indicated      Osteoporosis Screening:    General: Screening Current      Abdominal Aortic Aneurysm (AAA) Screening:        General: Screening Not Indicated      Lung Cancer Screening:     General: Screening Not Indicated      Hepatitis C Screening:    General: Screening Current    Screening, Brief Intervention, and Referral to Treatment (SBIRT)    Screening  Typical number of drinks in a day: 0  Typical number of drinks in a week: 0  Interpretation: Low risk drinking behavior.    Single Item Drug Screening:  How often have you used an illegal drug (including marijuana) or a prescription medication for non-medical reasons in the past year? never    Single Item Drug Screen Score: 0  Interpretation: Negative screen for possible drug use disorder    Social Drivers of Health     Financial Resource Strain: Low Risk  (11/7/2023)    Overall Financial Resource Strain (CARDIA)     Difficulty of Paying Living Expenses: Not hard at all   Food Insecurity: No Food Insecurity (11/14/2024)    Hunger Vital Sign     Worried About Running Out of Food in the Last Year: Never true     Ran Out of Food in the Last Year: Never true    Transportation Needs: No Transportation Needs (11/14/2024)    PRAPARE - Transportation     Lack of Transportation (Medical): No     Lack of Transportation (Non-Medical): No   Housing Stability: Unknown (11/14/2024)    Housing Stability Vital Sign     Unable to Pay for Housing in the Last Year: No     Homeless in the Last Year: No   Utilities: Not At Risk (11/14/2024)    [facility] Utilities     Threatened with loss of utilities: No     No results found.    Objective   /50 (BP Location: Left arm, Patient Position: Sitting, Cuff Size: Large)   Pulse 75   Temp (!) 96.8 °F (36 °C) (Tympanic)   Resp 16   Ht 5' (1.524 m)   Wt 66.2 kg (146 lb)   SpO2 98%   BMI 28.51 kg/m²     Physical Exam

## 2025-08-08 DIAGNOSIS — E78.00 HYPERCHOLESTEROLEMIA: ICD-10-CM

## 2025-08-08 RX ORDER — ATORVASTATIN CALCIUM 10 MG/1
10 TABLET, FILM COATED ORAL DAILY
Qty: 90 TABLET | Refills: 0 | Status: SHIPPED | OUTPATIENT
Start: 2025-08-08